# Patient Record
Sex: FEMALE | Race: BLACK OR AFRICAN AMERICAN | Employment: FULL TIME | URBAN - METROPOLITAN AREA
[De-identification: names, ages, dates, MRNs, and addresses within clinical notes are randomized per-mention and may not be internally consistent; named-entity substitution may affect disease eponyms.]

---

## 2017-01-06 ENCOUNTER — HOSPITAL ENCOUNTER (OUTPATIENT)
Dept: LAB | Age: 62
Discharge: HOME | End: 2017-01-06
Payer: COMMERCIAL

## 2017-01-06 ENCOUNTER — OFFICE VISIT (OUTPATIENT)
Dept: SURGERY | Age: 62
End: 2017-01-06

## 2017-01-06 VITALS
OXYGEN SATURATION: 99 % | WEIGHT: 178 LBS | SYSTOLIC BLOOD PRESSURE: 121 MMHG | TEMPERATURE: 97.8 F | HEART RATE: 69 BPM | DIASTOLIC BLOOD PRESSURE: 66 MMHG | HEIGHT: 61 IN | BODY MASS INDEX: 33.61 KG/M2 | RESPIRATION RATE: 14 BRPM

## 2017-01-06 DIAGNOSIS — E11.9 DIABETES MELLITUS TYPE 2, DIET-CONTROLLED (HCC): ICD-10-CM

## 2017-01-06 DIAGNOSIS — I10 ESSENTIAL HYPERTENSION: ICD-10-CM

## 2017-01-06 DIAGNOSIS — R09.89 BRUIT OF RIGHT CAROTID ARTERY: ICD-10-CM

## 2017-01-06 DIAGNOSIS — Z01.419 ENCOUNTER FOR GYNECOLOGICAL EXAMINATION WITHOUT ABNORMAL FINDING: Primary | ICD-10-CM

## 2017-01-06 DIAGNOSIS — Z98.891 HISTORY OF C-SECTION: ICD-10-CM

## 2017-01-06 DIAGNOSIS — N95.1 SYMPTOMATIC MENOPAUSAL OR FEMALE CLIMACTERIC STATES: ICD-10-CM

## 2017-01-06 PROCEDURE — 88142 CYTOPATH C/V THIN LAYER: CPT | Performed by: OBSTETRICS & GYNECOLOGY

## 2017-01-06 NOTE — PROGRESS NOTES
SUBJECTIVE: Jeanne Lam is a 63 y.o. female who presents with desire for annual well woman exam. No LMP recorded. Patient is postmenopausal.     History of endometriosis now in remission. History of gastric bypass surgery. History of intestinal adhesions and intestinal obstruction on more than one occasion. Menstrual period 2009 and also bled in mid-August as well. Pelvic ultrasound 2009 revealed uterus to be within normal limits of size, measuring 5.2 x 6.3 x 9.5 cm with multiple small fibroids, the largest measuring 2.5 x 3.2 x 3.0 cm and fundal. The endometrial stripe was 1.1 cm. The right ovary was not visualized. The left ovary measured 1.3 x 2.1 x 2.4 cm. No complaints today. Allergies   Allergen Reactions    Dilaudid [Hydromorphone] Nausea Only    Ultram [Tramadol] Nausea Only      Past Medical History   Diagnosis Date    Musculoskeletal disorder      Past Surgical History   Procedure Laterality Date    Hx  section      Hx tubal ligation  48    Hx cholecystectomy      Hx gastric bypass      Hx small bowel resection       OB History      Para Term  AB TAB SAB Ectopic Multiple Living    2 1   1             Family History   Problem Relation Age of Onset    Hypertension Mother     Diabetes Mother     Heart Disease Mother      CHF    Heart Disease Father      massive M. I.    Alcohol abuse Brother     Alcohol abuse Brother      Social History     Social History    Marital status: SINGLE     Spouse name: N/A    Number of children: N/A    Years of education: N/A     Occupational History    [employer]      Social History Main Topics    Smoking status: Never Smoker    Smokeless tobacco: Never Used    Alcohol use No    Drug use: Not on file    Sexual activity: No     Other Topics Concern    Not on file     Social History Narrative     Current Outpatient Prescriptions   Medication Sig Dispense Refill    glucose blood VI test strips (FREESTYLE INSULINX TEST STRIPS) strip Use to test blood sugar twice daily. 50 Strip 11    pravastatin (PRAVACHOL) 40 mg tablet Take 1 Tab by mouth nightly. 30 Tab 11    saxagliptin (ONGLYZA) 5 mg tab tablet TAKE 1 TABLET BY MOUTH EVERY DAY 30 Tab 5    cholecalciferol (VITAMIN D3) 5,000 unit capsule TAKE ONE CAPSULE BY MOUTH EVERY DAY  11    spironolactone-hydrochlorothiazide (ALDACTAZIDE) 25-25 mg per tablet TAKE 1 TABLET BY MOUTH ONCE A DAY 30 Tab 11    VITAMIN A PO Take  by mouth.  CYANOCOBALAMIN, VITAMIN B-12, (VITAMIN B-12 PO) Take  by mouth.  CALCIUM PO Take  by mouth.  oxyCODONE-acetaminophen (PERCOCET) 5-325 mg per tablet Take 1 Tab by mouth every eight (8) hours as needed for Pain. Max Daily Amount: 3 Tabs. 20 Tab 0       Review of Systems:   Constitutional: No weight change, chills or fever, anorexia, weakness or sleep disturbance . Cardiovascular: No chest pain, shortness of breath, or palpitations . Respiratory: No cough, shortness of breath, hemoptysis, or orthopnea . Neurologic: No syncope, headaches or seizures . Hematologic: No easy bruising or unusual bleeding . Psychiatric: No insomnia, confusion, depression, or anxiety . GI:No nausea and vomiting, diarrhea or constipation  . : See HPI . Musculoskeletal: No joint pain or muscle pain . Endocrine: No polydipsia, polyuria, cold intolerance, excessive fatigue, or sleep disturbance . Integumentary: No breast pain, lumps, nipple discharge, or axillary lumps .     Objective:     Visit Vitals    /66    Pulse 69    Temp 97.8 °F (36.6 °C) (Oral)    Resp 14    Ht 5' 1\" (1.549 m)    Wt 178 lb (80.7 kg)    SpO2 99%    BMI 33.63 kg/m2       General:  alert, cooperative, no distress, appears stated age   Skin:  no rash or abnormalities   Eyes: negative   Mouth: MMM no lesions   Lymph Nodes:  Cervical, supraclavicular, and axillary nodes normal.   Breast Exam: normal appearance, no masses or tenderness    Lungs:  clear to auscultation bilaterally   Heart:  regular rate and rhythm   Abdomen: soft, non-tender. Bowel sounds normal. No masses,  no organomegaly   Back:  Costovertebral angle tenderness absent   Genitourinary: Pelvic exam: VULVA: normal appearing vulva with no masses, tenderness or lesions, VAGINA: normal appearing vagina with normal color and discharge, no lesions, CERVIX: normal appearing cervix without discharge or lesions, UTERUS: uterus is normal size, shape, consistency and nontender, ADNEXA: normal adnexa in size, nontender and no masses. Examination restricted by obesity    Extremities:  extremities normal, atraumatic, no cyanosis or edema   Neurologic:  sensation grossly intact. Psychiatric:  non focal     ASSESSMENT:      ICD-10-CM ICD-9-CM    1. Encounter for gynecological examination without abnormal finding Z01.419 V72.31 PAP, LB, RFX HPV VXNYI([id number])   2. Symptomatic menopausal or female climacteric states N95.1 627.2    3. History of  Z98.891 V45.89    4. Diabetes mellitus type 2, diet-controlled (HCC) E11.9 250.00    5. Essential hypertension I10 401.9    6. Bruit of right carotid artery R09.89 785.9      Mammogram done already at [address] [place] [address]. Follow-up Disposition:  Return in about 1 year (around 2018), or if symptoms worsen or fail to improve.

## 2017-01-23 ENCOUNTER — OFFICE VISIT (OUTPATIENT)
Dept: INTERNAL MEDICINE CLINIC | Age: 62
End: 2017-01-23

## 2017-01-23 VITALS
TEMPERATURE: 98.7 F | SYSTOLIC BLOOD PRESSURE: 107 MMHG | DIASTOLIC BLOOD PRESSURE: 72 MMHG | HEART RATE: 91 BPM | HEIGHT: 61 IN | OXYGEN SATURATION: 96 % | WEIGHT: 172.7 LBS | BODY MASS INDEX: 32.61 KG/M2

## 2017-01-23 DIAGNOSIS — E11.9 CONTROLLED TYPE 2 DIABETES MELLITUS WITHOUT COMPLICATION, WITHOUT LONG-TERM CURRENT USE OF INSULIN (HCC): Primary | ICD-10-CM

## 2017-01-23 DIAGNOSIS — R09.89 BRUIT OF RIGHT CAROTID ARTERY: ICD-10-CM

## 2017-01-23 DIAGNOSIS — Z12.11 ENCOUNTER FOR SCREENING COLONOSCOPY: ICD-10-CM

## 2017-01-23 DIAGNOSIS — R63.4 WEIGHT LOSS: ICD-10-CM

## 2017-01-23 DIAGNOSIS — I10 ESSENTIAL HYPERTENSION: ICD-10-CM

## 2017-01-23 DIAGNOSIS — E78.5 DYSLIPIDEMIA: ICD-10-CM

## 2017-01-23 LAB — GLUCOSE POC: 194 MG/DL

## 2017-01-23 RX ORDER — GABAPENTIN 100 MG/1
100 CAPSULE ORAL 3 TIMES DAILY
COMMUNITY
End: 2018-02-09 | Stop reason: SDUPTHER

## 2017-01-23 NOTE — PROGRESS NOTES
Chief Complaint   Patient presents with    High Blood Sugar     patient states that blood sugar has been \"extremely high\"    Weight Loss     1. Have you been to the ER, urgent care clinic since your last visit? Hospitalized since your last visit? No    2. Have you seen or consulted any other health care providers outside of the [address] [place] [address] since your last visit? Include any pap smears or colon screening.  No

## 2017-01-23 NOTE — MR AVS SNAPSHOT
Visit Information Date & Time Provider Department Dept. Phone Encounter #  
 1/23/2017 10:45 AM Gold Aguilar 80 [facility] [id number] Follow-up Instructions Return in about 4 weeks (around 2/20/2017). Your Appointments 2/17/2017  9:00 AM  
Any with Nikky Peters MD  
[address] [place] [address] and Primary Care [facility]) Appt Note: 6mo f/u  
 [address] 1 [facility] [place]  
  
   
 [address] [id number] Upcoming Health Maintenance Date Due Pneumococcal 19-64 Medium Risk (1 of 1 - PPSV23) 3/11/1976 DTaP/Tdap/Td series (1 - Tdap) 3/11/1978 FOBT Q 1 YEAR AGE 50-75 4/30/2016 INFLUENZA AGE 9 TO ADULT 8/1/2016 MICROALBUMIN Q1 9/17/2016 FOOT EXAM Q1 3/7/2017 EYE EXAM RETINAL OR DILATED Q1 3/24/2017 HEMOGLOBIN A1C Q6M 5/10/2017 LIPID PANEL Q1 10/14/2017 BREAST CANCER SCRN MAMMOGRAM 10/19/2018 PAP AKA CERVICAL CYTOLOGY 1/6/2020 Allergies as of 1/23/2017  Review Complete On: 1/23/2017 By: Mandi Barragan Severity Noted Reaction Type Reactions Dilaudid [Hydromorphone]  10/02/2013    Nausea Only Ultram [Tramadol]  10/02/2013    Nausea Only Current Immunizations  Never Reviewed No immunizations on file. Not reviewed this visit You Were Diagnosed With   
  
 Codes Comments Controlled type 2 diabetes mellitus without complication, without long-term current use of insulin ([facility]    -  Primary ICD-10-CM: E11.9 ICD-9-CM: 250.00 Essential hypertension     ICD-10-CM: I10 
ICD-9-CM: 401.9 Dyslipidemia     ICD-10-CM: E78.5 ICD-9-CM: 272.4 Bruit of right carotid artery     ICD-10-CM: R09.89 ICD-9-CM: 475. 9 Weight loss     ICD-10-CM: R63.4 ICD-9-CM: 783.21 Vitals BP Pulse Temp Height(growth percentile) Weight(growth percentile) SpO2 107/72 (BP 1 Location: Right arm, BP Patient Position: Sitting) 91 98.7 °F (37.1 °C) (Oral) 5' 1\" (1.549 m) 172 lb 11.2 oz (78.3 kg) 96% BMI OB Status Smoking Status 32.63 kg/m2 Postmenopausal Never Smoker BMI and BSA Data Body Mass Index Body Surface Area  
 32.63 kg/m 2 1.84 m 2 Preferred Pharmacy Pharmacy Name Phone [id number] [address] [facility] [phone] Your Updated Medication List  
  
   
This list is accurate as of: 1/23/17 12:39 PM.  Always use your most recent med list.  
  
  
  
  
 CALCIUM PO Take  by mouth. cholecalciferol 5,000 unit capsule Commonly known as:  VITAMIN D3  
TAKE ONE CAPSULE BY MOUTH EVERY DAY  
  
 gabapentin 100 mg capsule Commonly known as:  NEURONTIN Take 100 mg by mouth three (3) times daily. glucose blood VI test strips strip Commonly known as:  FREESTYLE INSULINX TEST STRIPS Use to test blood sugar twice daily. pravastatin 40 mg tablet Commonly known as:  PRAVACHOL Take 1 Tab by mouth nightly. sAXagliptin 5 mg Tab tablet Commonly known as:  ONGLYZA TAKE 1 TABLET BY MOUTH EVERY DAY  
  
 spironolactone-hydrochlorothiazide 25-25 mg per tablet Commonly known as:  ALDACTAZIDE TAKE 1 TABLET BY MOUTH ONCE A DAY  
  
 VITAMIN A PO Take  by mouth. VITAMIN B-12 PO Take  by mouth. We Performed the Following AMB POC GLUCOSE BLOOD, BY GLUCOSE MONITORING DEVICE [82546 CPT(R)] CBC WITH AUTOMATED DIFF [68978 CPT(R)] HEMOGLOBIN A1C WITH EAG [49510 CPT(R)] METABOLIC PANEL, COMPREHENSIVE [29772 CPT(R)] Follow-up Instructions Return in about 4 weeks (around 2/20/2017). Introducing [facility] & [facility]! Dear Cornell Zamora: Thank you for requesting a [employer] account. Our records indicate that you already have an active [employer] account. You can access your account anytime at [email or web address] Did you know that you can access your hospital and ER discharge instructions at any time in [employer]? You can also review all of your test results from your hospital stay or ER visit. Additional Information If you have questions, please visit the Frequently Asked Questions section of the [employer] website at [email or web address] Remember, [employer] is NOT to be used for urgent needs. For medical emergencies, dial 911. Now available from your iPhone and Android! Please provide this summary of care documentation to your next provider. Your primary care clinician is listed as Petra De La Rosa. If you have any questions after today's visit, please call [phone].

## 2017-01-23 NOTE — PROGRESS NOTES
[address] [place] [address] and Primary Care  [place] [address]  [address]  [address] [address]  Phone:  [phone]  Fax: [phone]       Chief Complaint   Patient presents with    High Blood Sugar     patient states that blood sugar has been \"extremely high\"    Weight Loss   . SUBJECTIVE:    Po Mcdonough is a 63 y.o. female comes in with a history of hypertension, dyslipidemia, and chronic abdominal pain. She is concerned now about her diabetes. Her last hemoglobin A1C was 6.2 as has been the average one over the last two to three years. She states within the last several months her blood sugars have increased such that average fasting blood sugars are in the 150-160 range and on rare occasions when she checks a random sugar it was 200. She denies any polyuria, polydipsia. There has been no meaningful weight gain since I last saw her. She is contemplating having the mesh removed which she was told is the primary culprit leading to her chronic abdominal pain. Finally, additionally she is back to work part time. Current Outpatient Prescriptions   Medication Sig Dispense Refill    gabapentin (NEURONTIN) 100 mg capsule Take 100 mg by mouth three (3) times daily.  glucose blood VI test strips (FREESTYLE INSULINX TEST STRIPS) strip Use to test blood sugar twice daily. 50 Strip 11    pravastatin (PRAVACHOL) 40 mg tablet Take 1 Tab by mouth nightly. 30 Tab 11    saxagliptin (ONGLYZA) 5 mg tab tablet TAKE 1 TABLET BY MOUTH EVERY DAY 30 Tab 5    cholecalciferol (VITAMIN D3) 5,000 unit capsule TAKE ONE CAPSULE BY MOUTH EVERY DAY  11    spironolactone-hydrochlorothiazide (ALDACTAZIDE) 25-25 mg per tablet TAKE 1 TABLET BY MOUTH ONCE A DAY 30 Tab 11    VITAMIN A PO Take  by mouth.  CYANOCOBALAMIN, VITAMIN B-12, (VITAMIN B-12 PO) Take  by mouth.  CALCIUM PO Take  by mouth.        Past Medical History   Diagnosis Date    Musculoskeletal disorder      Past Surgical History Procedure Laterality Date    Hx  section      Hx tubal ligation      Hx cholecystectomy      Hx gastric bypass      Hx small bowel resection       Allergies   Allergen Reactions    Dilaudid [Hydromorphone] Nausea Only    Ultram [Tramadol] Nausea Only         REVIEW OF SYSTEMS:  General: negative for - chills or fever  ENT: negative for - headaches, nasal congestion or tinnitus  Respiratory: negative for - cough, hemoptysis, shortness of breath or wheezing  Cardiovascular : negative for - chest pain, edema, palpitations or shortness of breath  Gastrointestinal: negative for - abdominal pain, blood in stools, heartburn or nausea/vomiting  Genito-Urinary: no dysuria, trouble voiding, or hematuria  Musculoskeletal: negative for - gait disturbance, joint pain, joint stiffness or joint swelling  Neurological: no TIA or stroke symptoms  Hematologic: no bruises, no bleeding, no swollen glands  Integument: no lumps, mole changes, nail changes or rash  Endocrine: no malaise/lethargy or unexpected weight changes      Social History     Social History    Marital status: SINGLE     Spouse name: N/A    Number of children: N/A    Years of education: N/A     Occupational History    [employer]      Social History Main Topics    Smoking status: Never Smoker    Smokeless tobacco: Never Used    Alcohol use No    Drug use: No    Sexual activity: No     Other Topics Concern    None     Social History Narrative     Family History   Problem Relation Age of Onset    Hypertension Mother     Diabetes Mother     Heart Disease Mother      CHF    Heart Disease Father      massive M. IJaneal Burows Alcohol abuse Brother     Alcohol abuse Brother        OBJECTIVE:    Visit Vitals    /72 (BP 1 Location: Right arm, BP Patient Position: Sitting)    Pulse 91    Temp 98.7 °F (37.1 °C) (Oral)    Ht 5' 1\" (1.549 m)    Wt 172 lb 11.2 oz (78.3 kg)    SpO2 96%    BMI 32.63 kg/m2     CONSTITUTIONAL: well , well nourished, appears age appropriate  EYES: perrla, eom intact  ENMT:moist mucous membranes, pharynx clear  NECK: supple. Thyroid normal  RESPIRATORY: Chest: clear to ascultation and percussion   CARDIOVASCULAR: Heart: regular rate and rhythm  GASTROINTESTINAL: Abdomen: soft, bowel sounds active, mild tenderness to palpation left lower quadrant  HEMATOLOGIC: no pathological lymph nodes palpated  MUSCULOSKELETAL: Extremities: no edema, pulse 1+   INTEGUMENT: No unusual rashes or suspicious skin lesions noted. Nails appear normal.  NEUROLOGIC: non-focal exam   MENTAL STATUS: alert and oriented, appropriate affect      ASSESSMENT:  1. Controlled type 2 diabetes mellitus without complication, without long-term current use of insulin ([address]    2. Essential hypertension    3. Dyslipidemia    4. Bruit of right carotid artery    5. Weight loss    6. Encounter for screening colonoscopy        PLAN:    1. I will await the results of her hemoglobin A1C. Her blood sugar today is 194. Her last hemoglobin A1C's suggest this should not necessarily be the case in addition to the fact that over the last three months she has lost almost 10 pounds. 2. She wishes to have a colonoscopy. 3. Blood pressure today is excellent. No adjustments are made. 4. She will continue her statin as prescribed in view of her increased cardiovascular risk and the fact that she has subclinical disease. 5. She will follow-up with her general surgeon regarding her chronic abdominal pain. .  Orders Placed This Encounter    HEMOGLOBIN A1C WITH EAG    METABOLIC PANEL, COMPREHENSIVE    CBC WITH AUTOMATED DIFF    REFERRAL TO GASTROENTEROLOGY    AMB POC GLUCOSE BLOOD, BY GLUCOSE MONITORING DEVICE    gabapentin (NEURONTIN) 100 mg capsule         Follow-up Disposition:  Return in about 4 weeks (around 2/20/2017).       Paul Zamorano MD

## 2017-01-24 LAB
ALBUMIN SERPL-MCNC: 3.2 G/DL (ref 3.5–5.5)
ALBUMIN/GLOB SERPL: 0.8 {RATIO} (ref 1.1–2.5)
ALP SERPL-CCNC: 64 IU/L (ref 39–117)
ALT SERPL-CCNC: 12 IU/L (ref 0–32)
AST SERPL-CCNC: 10 IU/L (ref 0–40)
BASOPHILS # BLD AUTO: 0 X10E3/UL (ref 0–0.2)
BASOPHILS NFR BLD AUTO: 0 %
BILIRUB SERPL-MCNC: 0.4 MG/DL (ref 0–1.2)
BUN SERPL-MCNC: 8 MG/DL (ref 6–24)
BUN/CREAT SERPL: 14 (ref 9–23)
CALCIUM SERPL-MCNC: 8.9 MG/DL (ref 8.7–10.2)
CHLORIDE SERPL-SCNC: 96 MMOL/L (ref 96–106)
CO2 SERPL-SCNC: 25 MMOL/L (ref 18–29)
CREAT SERPL-MCNC: 0.59 MG/DL (ref 0.57–1)
EOSINOPHIL # BLD AUTO: 0 X10E3/UL (ref 0–0.4)
EOSINOPHIL NFR BLD AUTO: 1 %
ERYTHROCYTE [DISTWIDTH] IN BLOOD BY AUTOMATED COUNT: 19.1 % (ref 12.3–15.4)
EST. AVERAGE GLUCOSE BLD GHB EST-MCNC: 131 MG/DL
GLOBULIN SER CALC-MCNC: 4 G/DL (ref 1.5–4.5)
GLUCOSE SERPL-MCNC: 139 MG/DL (ref 65–99)
HBA1C MFR BLD: 6.2 % (ref 4.8–5.6)
HCT VFR BLD AUTO: 27.7 % (ref 34–46.6)
HGB BLD-MCNC: 8.2 G/DL (ref 11.1–15.9)
IMM GRANULOCYTES # BLD: 0 X10E3/UL (ref 0–0.1)
IMM GRANULOCYTES NFR BLD: 0 %
LYMPHOCYTES # BLD AUTO: 2.3 X10E3/UL (ref 0.7–3.1)
LYMPHOCYTES NFR BLD AUTO: 43 %
MCH RBC QN AUTO: 23.4 PG (ref 26.6–33)
MCHC RBC AUTO-ENTMCNC: 29.6 G/DL (ref 31.5–35.7)
MCV RBC AUTO: 79 FL (ref 79–97)
MONOCYTES # BLD AUTO: 1 X10E3/UL (ref 0.1–0.9)
MONOCYTES NFR BLD AUTO: 18 %
NEUTROPHILS # BLD AUTO: 2 X10E3/UL (ref 1.4–7)
NEUTROPHILS NFR BLD AUTO: 38 %
PLATELET # BLD AUTO: 520 X10E3/UL (ref 150–379)
POTASSIUM SERPL-SCNC: 4.4 MMOL/L (ref 3.5–5.2)
PROT SERPL-MCNC: 7.2 G/DL (ref 6–8.5)
RBC # BLD AUTO: 3.51 X10E6/UL (ref 3.77–5.28)
SODIUM SERPL-SCNC: 137 MMOL/L (ref 134–144)
WBC # BLD AUTO: 5.3 X10E3/UL (ref 3.4–10.8)

## 2017-02-02 ENCOUNTER — TELEPHONE (OUTPATIENT)
Dept: INTERNAL MEDICINE CLINIC | Age: 62
End: 2017-02-02

## 2017-02-03 ENCOUNTER — LAB ONLY (OUTPATIENT)
Dept: INTERNAL MEDICINE CLINIC | Age: 62
End: 2017-02-03

## 2017-02-03 ENCOUNTER — OFFICE VISIT (OUTPATIENT)
Dept: INTERNAL MEDICINE CLINIC | Age: 62
End: 2017-02-03

## 2017-02-03 VITALS
HEIGHT: 61 IN | BODY MASS INDEX: 32.59 KG/M2 | OXYGEN SATURATION: 97 % | WEIGHT: 172.6 LBS | DIASTOLIC BLOOD PRESSURE: 81 MMHG | TEMPERATURE: 99.7 F | SYSTOLIC BLOOD PRESSURE: 129 MMHG | HEART RATE: 117 BPM

## 2017-02-03 DIAGNOSIS — R10.10 PAIN OF UPPER ABDOMEN: ICD-10-CM

## 2017-02-03 DIAGNOSIS — D64.9 ANEMIA, UNSPECIFIED TYPE: Primary | ICD-10-CM

## 2017-02-03 DIAGNOSIS — D64.9 ANEMIA, UNSPECIFIED TYPE: ICD-10-CM

## 2017-02-03 RX ORDER — HYDROCODONE BITARTRATE AND ACETAMINOPHEN 5; 325 MG/1; MG/1
1 TABLET ORAL
Qty: 40 TAB | Refills: 0 | Status: SHIPPED | OUTPATIENT
Start: 2017-02-03

## 2017-02-03 RX ORDER — METFORMIN HYDROCHLORIDE 500 MG/1
500 TABLET, EXTENDED RELEASE ORAL
Qty: 90 TAB | Refills: 11 | Status: SHIPPED | OUTPATIENT
Start: 2017-02-03 | End: 2018-02-28

## 2017-02-03 RX ORDER — METFORMIN HYDROCHLORIDE 500 MG/1
500 TABLET, EXTENDED RELEASE ORAL 2 TIMES DAILY WITH MEALS
Qty: 60 TAB | Refills: 11 | Status: SHIPPED | OUTPATIENT
Start: 2017-02-03 | End: 2017-02-03 | Stop reason: CLARIF

## 2017-02-03 NOTE — PROGRESS NOTES
Chief Complaint   Patient presents with    Chest Pain     patient states that she is having pressure in her chest.    Other     patient states that since she was here in Jan. another \"knot\" has appeared on her stomach.  Abdominal Pain     1. Have you been to the ER, urgent care clinic since your last visit? Hospitalized since your last visit? No    2. Have you seen or consulted any other health care providers outside of the [address] [place] [address] since your last visit? Include any pap smears or colon screening.  No

## 2017-02-03 NOTE — MR AVS SNAPSHOT
Visit Information Date & Time Provider Department Dept. Phone Encounter #  
 2/3/2017 12:30 PM Crow Dominguezdeandramartha Funez 80 Sports Medicine and Primary Care [phone] [id number] Your Appointments 2/17/2017  9:00 AM  
Any with Essie Whitehead MD  
[address] [place] [address] and Primary Care [facility]) Appt Note: 6mo f/u  
 [address] 1 [facility]  
  
   
 [address] [address] Upcoming Health Maintenance Date Due Pneumococcal 19-64 Medium Risk (1 of 1 - PPSV23) 3/11/1976 DTaP/Tdap/Td series (1 - Tdap) 3/11/1978 FOBT Q 1 YEAR AGE 50-75 4/30/2016 INFLUENZA AGE 9 TO ADULT 8/1/2016 MICROALBUMIN Q1 9/17/2016 FOOT EXAM Q1 3/7/2017 EYE EXAM RETINAL OR DILATED Q1 3/24/2017 HEMOGLOBIN A1C Q6M 7/23/2017 LIPID PANEL Q1 10/14/2017 BREAST CANCER SCRN MAMMOGRAM 10/19/2018 PAP AKA CERVICAL CYTOLOGY 1/6/2020 Allergies as of 2/3/2017  Review Complete On: 1/29/2017 By: Essie Whitehead MD  
  
 Severity Noted Reaction Type Reactions Dilaudid [Hydromorphone]  10/02/2013    Nausea Only Ultram [Tramadol]  10/02/2013    Nausea Only Current Immunizations  Never Reviewed No immunizations on file. Not reviewed this visit You Were Diagnosed With   
  
 Codes Comments Controlled type 2 diabetes mellitus without complication, without long-term current use of insulin ([facility]    -  Primary ICD-10-CM: E11.9 ICD-9-CM: 250.00 Pain of upper abdomen     ICD-10-CM: R10.10 ICD-9-CM: 789.09 Vitals BP Pulse Temp Height(growth percentile) Weight(growth percentile) SpO2  
 129/81 (BP 1 Location: Right arm, BP Patient Position: Sitting) (!) 117 99.7 °F (37.6 °C) (Oral) 5' 1\" (1.549 m) 172 lb 9.6 oz (78.3 kg) 97% BMI OB Status Smoking Status 32.61 kg/m2 Postmenopausal Never Smoker BMI and BSA Data  Body Mass Index Body Surface Area  
 32.61 kg/m 2 1.84 m 2  
  
  
 Preferred Pharmacy Pharmacy Name Phone [id number] Al, [address] [place] [phone] Your Updated Medication List  
  
   
This list is accurate as of: 2/3/17  2:50 PM.  Always use your most recent med list.  
  
  
  
  
 CALCIUM PO Take  by mouth. cholecalciferol 5,000 unit capsule Commonly known as:  VITAMIN D3  
TAKE ONE CAPSULE BY MOUTH EVERY DAY  
  
 gabapentin 100 mg capsule Commonly known as:  NEURONTIN Take 100 mg by mouth three (3) times daily. glucose blood VI test strips strip Commonly known as:  FREESTYLE INSULINX TEST STRIPS Use to test blood sugar twice daily. HYDROcodone-acetaminophen 5-325 mg per tablet Commonly known as:  Nora Saúl Take 1 Tab by mouth every eight (8) hours as needed for Pain. Max Daily Amount: 3 Tabs. metFORMIN  mg tablet Commonly known as:  GLUCOPHAGE XR Take 1 Tab by mouth two (2) times daily (with meals). pravastatin 40 mg tablet Commonly known as:  PRAVACHOL Take 1 Tab by mouth nightly. sAXagliptin 5 mg Tab tablet Commonly known as:  ONGLYZA TAKE 1 TABLET BY MOUTH EVERY DAY  
  
 spironolactone-hydrochlorothiazide 25-25 mg per tablet Commonly known as:  ALDACTAZIDE TAKE 1 TABLET BY MOUTH ONCE A DAY  
  
 VITAMIN A PO Take  by mouth. VITAMIN B-12 PO Take  by mouth. Prescriptions Printed Refills HYDROcodone-acetaminophen (NORCO) 5-325 mg per tablet 0 Sig: Take 1 Tab by mouth every eight (8) hours as needed for Pain. Max Daily Amount: 3 Tabs. Class: Print Route: Oral  
  
Prescriptions Sent to Pharmacy Refills  
 metFORMIN ER (GLUCOPHAGE XR) 500 mg tablet 11 Sig: Take 1 Tab by mouth two (2) times daily (with meals). Class: Normal  
 Pharmacy: [address] Ph #: [phone] Route: Oral  
  
Introducing [facility] & HEALTH SERVICES! Dear Luciana Dasilva: Thank you for requesting a [employer] account. Our records indicate that you already have an active [employer] account. You can access your account anytime at [email or web address] Did you know that you can access your hospital and ER discharge instructions at any time in [employer]? You can also review all of your test results from your hospital stay or ER visit. Additional Information If you have questions, please visit the Frequently Asked Questions section of the [employer] website at [email or web address] Remember, [employer] is NOT to be used for urgent needs. For medical emergencies, dial 911. Now available from your iPhone and Android! Please provide this summary of care documentation to your next provider. Your primary care clinician is listed as Petra De La Rosa. If you have any questions after today's visit, please call [phone].

## 2017-02-04 NOTE — PROGRESS NOTES
[facility]  [place] [address]  [address]  [address] [place] [address]  Phone:  [phone]  Fax: [phone]       Chief Complaint   Patient presents with    Chest Pain     patient states that she is having pressure in her chest.    Other     patient states that since she was here in Jan. another \"knot\" has appeared on her stomach.  Abdominal Pain   . SUBJECTIVE:    Nikia Mcmanus is a 63 y.o. female With a past history of chronic abdominal pain, primary hypertension, dyslipidemia, and carotid bruits, and comes in for follow-up. She is concerned because her blood sugars have progressively increased which is fascinating. Her last hemoglobin A1C done two weeks ago was perfectly normal but this change in her glucose status occurred within the last several weeks. She is not getting any new medication. She is currently taking Onglyza but obviously needs something more. She continues to complain of abdominal pain and would like something for pain. Allegedly she plans surgery in the near future. Current Outpatient Prescriptions   Medication Sig Dispense Refill    HYDROcodone-acetaminophen (NORCO) 5-325 mg per tablet Take 1 Tab by mouth every eight (8) hours as needed for Pain. Max Daily Amount: 3 Tabs. 40 Tab 0    metFORMIN ER (GLUCOPHAGE XR) 500 mg tablet Take 1 Tab by mouth three (3) times daily (with meals). 90 Tab 11    gabapentin (NEURONTIN) 100 mg capsule Take 100 mg by mouth three (3) times daily.  glucose blood VI test strips (FREESTYLE INSULINX TEST STRIPS) strip Use to test blood sugar twice daily. 50 Strip 11    pravastatin (PRAVACHOL) 40 mg tablet Take 1 Tab by mouth nightly.  30 Tab 11    saxagliptin (ONGLYZA) 5 mg tab tablet TAKE 1 TABLET BY MOUTH EVERY DAY 30 Tab 5    cholecalciferol (VITAMIN D3) 5,000 unit capsule TAKE ONE CAPSULE BY MOUTH EVERY DAY  11    spironolactone-hydrochlorothiazide (ALDACTAZIDE) 25-25 mg per tablet TAKE 1 TABLET BY MOUTH ONCE A DAY 30 Tab 11    VITAMIN A PO Take  by mouth.  CYANOCOBALAMIN, VITAMIN B-12, (VITAMIN B-12 PO) Take  by mouth.  CALCIUM PO Take  by mouth. Past Medical History   Diagnosis Date    Musculoskeletal disorder      Past Surgical History   Procedure Laterality Date    Hx  section      Hx tubal ligation      Hx cholecystectomy      Hx gastric bypass      Hx small bowel resection       Allergies   Allergen Reactions    Dilaudid [Hydromorphone] Nausea Only    Ultram [Tramadol] Nausea Only         REVIEW OF SYSTEMS:  General: negative for - chills or fever  ENT: negative for - headaches, nasal congestion or tinnitus  Respiratory: negative for - cough, hemoptysis, shortness of breath or wheezing  Cardiovascular : negative for - chest pain, edema, palpitations or shortness of breath  Gastrointestinal: negative for - abdominal pain, blood in stools, heartburn or nausea/vomiting  Genito-Urinary: no dysuria, trouble voiding, or hematuria  Musculoskeletal: negative for - gait disturbance, joint pain, joint stiffness or joint swelling  Neurological: no TIA or stroke symptoms  Hematologic: no bruises, no bleeding, no swollen glands  Integument: no lumps, mole changes, nail changes or rash  Endocrine: no malaise/lethargy or unexpected weight changes      Social History     Social History    Marital status: SINGLE     Spouse name: N/A    Number of children: N/A    Years of education: N/A     Occupational History    [employer]      Social History Main Topics    Smoking status: Never Smoker    Smokeless tobacco: Never Used    Alcohol use No    Drug use: No    Sexual activity: No     Other Topics Concern    None     Social History Narrative     Family History   Problem Relation Age of Onset    Hypertension Mother     Diabetes Mother     Heart Disease Mother      CHF    Heart Disease Father      massive M. I.    Alcohol abuse Brother     Alcohol abuse Brother OBJECTIVE:    Visit Vitals    /81 (BP 1 Location: Right arm, BP Patient Position: Sitting)    Pulse (!) 117    Temp 99.7 °F (37.6 °C) (Oral)    Ht 5' 1\" (1.549 m)    Wt 172 lb 9.6 oz (78.3 kg)    SpO2 97%    BMI 32.61 kg/m2     CONSTITUTIONAL: well , well nourished, appears age appropriate  EYES: perrla, eom intact  ENMT:moist mucous membranes, pharynx clear  NECK: supple. Thyroid normal  RESPIRATORY: Chest: clear to ascultation and percussion   CARDIOVASCULAR: Heart: regular rate and rhythm  GASTROINTESTINAL: Abdomen: soft, bowel sounds active, mild generalized tenderness, no organomegaly or masses, ventral hernia  HEMATOLOGIC: no pathological lymph nodes palpated  MUSCULOSKELETAL: Extremities: no edema, pulse 1+   INTEGUMENT: No unusual rashes or suspicious skin lesions noted. Nails appear normal.  NEUROLOGIC: non-focal exam   MENTAL STATUS: alert and oriented, appropriate affect      ASSESSMENT:  1. Uncontrolled type 2 diabetes mellitus without complication, without long-term current use of insulin (HCC)    2. Pain of upper abdomen    3. Anemia, unspecified type        PLAN:    1. Her diabetes is poorly controlled. Part of this is dietary but obviously she has progressed to the point where she needs additional medications. I will therefore place her on Metformin extended release 500 mg with breakfast, lunch and dinner. She will continue the Onglyza 5 mg every day. I will see how she fairs with this. She will return to the office in the next one to two weeks. If her blood sugars remain elevated, specifically over 140 she will give us a call. 2. As far as her blood pressure pain is concerned, she will follow-up with the general surgeon. I will give her Hydrocodone but I again explained to her that this will only be a limited fashion. Ideally pain control needs to be in the hands of the surgeon. 3. She is also mildly anemia and I will repeat the CBC today.    4. Her blood pressure is excellent. No adjustments are made. 5. She will continue her statin as prescribed in view of her secondary cardiovascular risk. .  Orders Placed This Encounter    CBC WITH AUTOMATED DIFF    METABOLIC PANEL, BASIC    FRUCTOSAMINE    HYDROcodone-acetaminophen (NORCO) 5-325 mg per tablet    DISCONTD: metFORMIN ER (GLUCOPHAGE XR) 500 mg tablet    metFORMIN ER (GLUCOPHAGE XR) 500 mg tablet         Follow-up Disposition:  Return in about 2 weeks (around 2/17/2017).       Shira Hutchison MD

## 2017-02-06 ENCOUNTER — TELEPHONE (OUTPATIENT)
Dept: INTERNAL MEDICINE CLINIC | Age: 62
End: 2017-02-06

## 2017-02-06 LAB
BASOPHILS # BLD AUTO: NORMAL 10*3/UL
BUN SERPL-MCNC: 9 MG/DL (ref 6–24)
BUN/CREAT SERPL: 18 (ref 9–23)
CALCIUM SERPL-MCNC: 8.9 MG/DL (ref 8.7–10.2)
CHLORIDE SERPL-SCNC: 90 MMOL/L (ref 96–106)
CO2 SERPL-SCNC: 24 MMOL/L (ref 18–29)
CREAT SERPL-MCNC: 0.49 MG/DL (ref 0.57–1)
EOSINOPHIL # BLD AUTO: NORMAL 10*3/UL
EOSINOPHIL NFR BLD AUTO: NORMAL %
FRUCTOSAMINE SERPL-SCNC: 227 UMOL/L (ref 0–285)
GLUCOSE SERPL-MCNC: 319 MG/DL (ref 65–99)
HCT VFR BLD AUTO: NORMAL %
HGB BLD-MCNC: NORMAL G/DL
LYMPHOCYTES # BLD AUTO: NORMAL 10*3/UL
LYMPHOCYTES NFR BLD AUTO: NORMAL %
MONOCYTES NFR BLD AUTO: NORMAL %
NEUTROPHILS NFR BLD AUTO: NORMAL %
PLATELET # BLD AUTO: NORMAL 10*3/UL
POTASSIUM SERPL-SCNC: 4.3 MMOL/L (ref 3.5–5.2)
RBC # BLD AUTO: NORMAL 10*6/UL
SODIUM SERPL-SCNC: 132 MMOL/L (ref 134–144)
WBC # BLD AUTO: NORMAL X10E3/UL

## 2017-02-13 ENCOUNTER — PATIENT OUTREACH (OUTPATIENT)
Dept: INTERNAL MEDICINE CLINIC | Age: 62
End: 2017-02-13

## 2017-02-13 NOTE — PROGRESS NOTES
Outreach attempted patient Discharged from [facility] on 2/11/17 scheduled follow up  with Dr Manisha Ruiz 2/17/17.

## 2017-06-22 ENCOUNTER — OFFICE VISIT (OUTPATIENT)
Dept: INTERNAL MEDICINE CLINIC | Age: 62
End: 2017-06-22

## 2017-06-22 VITALS
OXYGEN SATURATION: 96 % | WEIGHT: 165.8 LBS | TEMPERATURE: 98.1 F | RESPIRATION RATE: 18 BRPM | DIASTOLIC BLOOD PRESSURE: 79 MMHG | BODY MASS INDEX: 31.3 KG/M2 | HEART RATE: 79 BPM | SYSTOLIC BLOOD PRESSURE: 115 MMHG | HEIGHT: 61 IN

## 2017-06-22 DIAGNOSIS — T85.79XD INFECTED PROSTHETIC MESH OF ABDOMINAL WALL, SUBSEQUENT ENCOUNTER: Primary | ICD-10-CM

## 2017-06-22 DIAGNOSIS — E78.5 DYSLIPIDEMIA: ICD-10-CM

## 2017-06-22 DIAGNOSIS — E11.9 CONTROLLED TYPE 2 DIABETES MELLITUS WITHOUT COMPLICATION, WITHOUT LONG-TERM CURRENT USE OF INSULIN (HCC): ICD-10-CM

## 2017-06-22 DIAGNOSIS — E66.09 NON MORBID OBESITY DUE TO EXCESS CALORIES: ICD-10-CM

## 2017-06-22 DIAGNOSIS — I10 ESSENTIAL HYPERTENSION: ICD-10-CM

## 2017-06-22 PROBLEM — T85.79XA INFECTED PROSTHETIC MESH OF ABDOMINAL WALL (HCC): Status: ACTIVE | Noted: 2017-06-22

## 2017-06-22 RX ORDER — OXYCODONE HYDROCHLORIDE 5 MG/1
5 TABLET ORAL
Qty: 10 TAB | Refills: 0 | Status: SHIPPED | OUTPATIENT
Start: 2017-06-22 | End: 2018-02-28

## 2017-06-22 RX ORDER — SIMVASTATIN 20 MG/1
TABLET, FILM COATED ORAL
COMMUNITY
End: 2018-10-02 | Stop reason: CLARIF

## 2017-06-22 NOTE — PROGRESS NOTES
[address] [place] [address] and Primary Care  [place] [address]  [address] [place] [address]  Phone:  [phone]  Fax: [phone]       Chief Complaint   Patient presents with    Follow-up   . SUBJECTIVE:    Anna Tilley is a 63 y.o. female comes in for return visit having had a rather prolonged, stormy hospital course. She had an infected mesh which required four hours of surgery and formation of an ostomy. She went to [facility] because she was getting fed parenterally for an extended period of time. Finally she is back home and apparently is doing well, eating regular food. They plan to take the ostomy down in the next several months. During her hospital stay she was placed on insulin which is not surprising, Lantus and Humalog although she was not really given the appropriate directions. She is additionally taking her statin. She resumed her antihypertensive medication which is Aldactazide 25/25 one q.a.m. Since I last saw her she has lost a considerable amount of weight but this is now stable. Current Outpatient Prescriptions   Medication Sig Dispense Refill    simvastatin (ZOCOR) 20 mg tablet Take  by mouth nightly.  oxyCODONE IR (ROXICODONE) 5 mg immediate release tablet Take 1 Tab by mouth two (2) times daily as needed for Pain. Max Daily Amount: 10 mg. 10 Tab 0    HYDROcodone-acetaminophen (NORCO) 5-325 mg per tablet Take 1 Tab by mouth every eight (8) hours as needed for Pain. Max Daily Amount: 3 Tabs. 40 Tab 0    metFORMIN ER (GLUCOPHAGE XR) 500 mg tablet Take 1 Tab by mouth three (3) times daily (with meals). 90 Tab 11    gabapentin (NEURONTIN) 100 mg capsule Take 100 mg by mouth three (3) times daily.  glucose blood VI test strips (FREESTYLE INSULINX TEST STRIPS) strip Use to test blood sugar twice daily. 50 Strip 11    pravastatin (PRAVACHOL) 40 mg tablet Take 1 Tab by mouth nightly.  30 Tab 11    saxagliptin (ONGLYZA) 5 mg tab tablet TAKE 1 TABLET BY MOUTH EVERY DAY 30 Tab 5    cholecalciferol (VITAMIN D3) 5,000 unit capsule TAKE ONE CAPSULE BY MOUTH EVERY DAY  11    spironolactone-hydrochlorothiazide (ALDACTAZIDE) 25-25 mg per tablet TAKE 1 TABLET BY MOUTH ONCE A DAY 30 Tab 11    VITAMIN A PO Take  by mouth.  CYANOCOBALAMIN, VITAMIN B-12, (VITAMIN B-12 PO) Take  by mouth.  CALCIUM PO Take  by mouth.       Insulin Needles, Disposable, (KEYANA PEN NEEDLE) 32 gauge x \" ndle Use to inject insulin once daily 100 Pen Needle 11     Past Medical History:   Diagnosis Date    Musculoskeletal disorder      Past Surgical History:   Procedure Laterality Date    HX  SECTION      HX CHOLECYSTECTOMY      HX GASTRIC BYPASS      HX HERNIA REPAIR Bilateral     HX SMALL BOWEL RESECTION      HX TUBAL LIGATION       Allergies   Allergen Reactions    Dilaudid [Hydromorphone] Nausea Only    Ultram [Tramadol] Nausea Only         REVIEW OF SYSTEMS:  General: negative for - chills or fever  ENT: negative for - headaches, nasal congestion or tinnitus  Respiratory: negative for - cough, hemoptysis, shortness of breath or wheezing  Cardiovascular : negative for - chest pain, edema, palpitations or shortness of breath  Gastrointestinal: negative for - abdominal pain, blood in stools, heartburn or nausea/vomiting  Genito-Urinary: no dysuria, trouble voiding, or hematuria  Musculoskeletal: negative for - gait disturbance, joint pain, joint stiffness or joint swelling  Neurological: no TIA or stroke symptoms  Hematologic: no bruises, no bleeding, no swollen glands  Integument: no lumps, mole changes, nail changes or rash  Endocrine: no malaise/lethargy or unexpected weight changes      Social History     Social History    Marital status: SINGLE     Spouse name: N/A    Number of children: N/A    Years of education: N/A     Occupational History    [employer]      Social History Main Topics    Smoking status: Never Smoker    Smokeless tobacco: Never Used    Alcohol use No    Drug use: No    Sexual activity: No     Other Topics Concern    None     Social History Narrative     Family History   Problem Relation Age of Onset    Hypertension Mother     Diabetes Mother     Heart Disease Mother      CHF    Heart Disease Father      massive M. I.    Alcohol abuse Brother     Alcohol abuse Brother        OBJECTIVE:    Visit Vitals    /79 (BP 1 Location: Left arm, BP Patient Position: Sitting)    Pulse 79    Temp 98.1 °F (36.7 °C) (Oral)    Resp 18    Ht 5' 1\" (1.549 m)    Wt 165 lb 12.8 oz (75.2 kg)    SpO2 96%    BMI 31.33 kg/m2     CONSTITUTIONAL: well , well nourished, appears age appropriate  EYES: perrla, eom intact  ENMT:moist mucous membranes, pharynx clear  NECK: supple. Thyroid normal  RESPIRATORY: Chest: clear to ascultation and percussion   CARDIOVASCULAR: Heart: regular rate and rhythm  GASTROINTESTINAL: Abdomen: soft, bowel sounds active  HEMATOLOGIC: no pathological lymph nodes palpated  MUSCULOSKELETAL: Extremities: no edema, pulse 1+   INTEGUMENT: No unusual rashes or suspicious skin lesions noted. Nails appear normal.  NEUROLOGIC: non-focal exam   MENTAL STATUS: alert and oriented, appropriate affect      ASSESSMENT:  1. Infected prosthetic mesh of abdominal wall, subsequent encounter    2. Controlled type 2 diabetes mellitus without complication, without long-term current use of insulin ([address]    3. Dyslipidemia    4. Non morbid obesity due to excess calories    5. Essential hypertension        PLAN:    1. From a surgical perspective she will follow-up with her surgeon at [facility]. 2. From a diabetic perspective she will discontinue the Humalog and just take the Lantus at 8 units per day level. I suspect she probably does not need this for now. She should not develop any hypoglycemia but if this happens she is to give me a call. 3. Blood pressure is acceptable today.   She will continue her Aldactazide as prescribed. 4. She will also continue her statin which is Simvastatin 20 mg q.h.s.    5. I encourage her to remain as physically active as possible. .  Orders Placed This Encounter    CBC WITH AUTOMATED DIFF    METABOLIC PANEL, COMPREHENSIVE    HEMOGLOBIN A1C WITH EAG    APOLIPOPROTEIN B    simvastatin (ZOCOR) 20 mg tablet    oxyCODONE IR (ROXICODONE) 5 mg immediate release tablet         Follow-up Disposition:  Return in about 4 weeks (around 7/20/2017).       Jazmine Martinez MD

## 2017-06-22 NOTE — MR AVS SNAPSHOT
Visit Information Date & Time Provider Department Dept. Phone Encounter #  
 6/22/2017 10:45 AM [facility] [phone] [id number] Your Appointments 7/28/2017  9:30 AM  
Any with Allyn Hutchins MD  
[address] [place] [address] and Primary Care [facility]) Appt Note: 1 month f/u  
 [address] [facility]  
  
   
 [id number] Upcoming Health Maintenance Date Due Pneumococcal 19-64 Medium Risk (1 of 1 - PPSV23) 3/11/1976 DTaP/Tdap/Td series (1 - Tdap) 3/11/1978 FOBT Q 1 YEAR AGE 50-75 4/30/2016 MICROALBUMIN Q1 9/17/2016 FOOT EXAM Q1 3/7/2017 ZOSTER VACCINE AGE 60> 3/11/2017 EYE EXAM RETINAL OR DILATED Q1 3/24/2017 HEMOGLOBIN A1C Q6M 7/23/2017 INFLUENZA AGE 9 TO ADULT 8/1/2017 LIPID PANEL Q1 10/14/2017 BREAST CANCER SCRN MAMMOGRAM 10/19/2018 PAP AKA CERVICAL CYTOLOGY 1/6/2020 Allergies as of 6/22/2017  Review Complete On: 6/22/2017 By: Luciana Velázquez Severity Noted Reaction Type Reactions Dilaudid [Hydromorphone]  10/02/2013    Nausea Only Ultram [Tramadol]  10/02/2013    Nausea Only Current Immunizations  Never Reviewed No immunizations on file. Not reviewed this visit You Were Diagnosed With   
  
 Codes Comments Infected prosthetic mesh of abdominal wall, subsequent encounter    -  Primary ICD-10-CM: T85.79XD ICD-9-CM: V58.89 Controlled type 2 diabetes mellitus without complication, without long-term current use of insulin ([facility] [address]     ICD-10-CM: E11.9 ICD-9-CM: 250.00 Dyslipidemia     ICD-10-CM: E78.5 ICD-9-CM: 272.4 Non morbid obesity due to excess calories     ICD-10-CM: E66.09 
ICD-9-CM: 278.00 Essential hypertension     ICD-10-CM: I10 
ICD-9-CM: 401.9 Vitals BP Pulse Temp Resp Height(growth percentile) Weight(growth percentile) 115/79 (BP 1 Location: Left arm, BP Patient Position: Sitting) 79 98.1 °F (36.7 °C) (Oral) 18 5' 1\" (1.549 m) 165 lb 12.8 oz (75.2 kg) SpO2 BMI OB Status Smoking Status 96% 31.33 kg/m2 Postmenopausal Never Smoker BMI and BSA Data Body Mass Index Body Surface Area  
 31.33 kg/m 2 1.8 m 2 Preferred Pharmacy Pharmacy Name Phone [id number], [address] [place] [phone] Your Updated Medication List  
  
   
This list is accurate as of: 6/22/17  1:08 PM.  Always use your most recent med list.  
  
  
  
  
 CALCIUM PO Take  by mouth. cholecalciferol 5,000 unit capsule Commonly known as:  VITAMIN D3  
TAKE ONE CAPSULE BY MOUTH EVERY DAY  
  
 gabapentin 100 mg capsule Commonly known as:  NEURONTIN Take 100 mg by mouth three (3) times daily. glucose blood VI test strips strip Commonly known as:  FREESTYLE INSULINX TEST STRIPS Use to test blood sugar twice daily. HYDROcodone-acetaminophen 5-325 mg per tablet Commonly known as:  Dorota Safe Take 1 Tab by mouth every eight (8) hours as needed for Pain. Max Daily Amount: 3 Tabs. metFORMIN  mg tablet Commonly known as:  GLUCOPHAGE XR Take 1 Tab by mouth three (3) times daily (with meals). oxyCODONE IR 5 mg immediate release tablet Commonly known as:  Clista Mate Take 1 Tab by mouth two (2) times daily as needed for Pain. Max Daily Amount: 10 mg.  
  
 pravastatin 40 mg tablet Commonly known as:  PRAVACHOL Take 1 Tab by mouth nightly. sAXagliptin 5 mg Tab tablet Commonly known as:  ONGLYZA TAKE 1 TABLET BY MOUTH EVERY DAY  
  
 simvastatin 20 mg tablet Commonly known as:  ZOCOR Take  by mouth nightly. spironolactone-hydrochlorothiazide 25-25 mg per tablet Commonly known as:  ALDACTAZIDE TAKE 1 TABLET BY MOUTH ONCE A DAY  
  
 VITAMIN A PO Take  by mouth. VITAMIN B-12 PO Take  by mouth. Prescriptions Printed Refills  
 oxyCODONE IR (ROXICODONE) 5 mg immediate release tablet 0 Sig: Take 1 Tab by mouth two (2) times daily as needed for Pain. Max Daily Amount: 10 mg.  
 Class: Print Route: Oral  
  
We Performed the Following APOLIPOPROTEIN B [id number] CPT(R)] CBC WITH AUTOMATED DIFF [54928 CPT(R)] HEMOGLOBIN A1C WITH EAG [54859 CPT(R)] METABOLIC PANEL, COMPREHENSIVE [74637 CPT(R)] Introducing [facility] SERVICES! Dear Devang Evans: Thank you for requesting a [employer] account. Our records indicate that you already have an active [employer] account. You can access your account anytime at [email or web address] Did you know that you can access your hospital and ER discharge instructions at any time in [employer]? You can also review all of your test results from your hospital stay or ER visit. Additional Information If you have questions, please visit the Frequently Asked Questions section of the [employer] website at [email or web address] Remember, [employer] is NOT to be used for urgent needs. For medical emergencies, dial 911. Now available from your iPhone and Android! Please provide this summary of care documentation to your next provider. Your primary care clinician is listed as Petra De La Rosa. If you have any questions after today's visit, please call [phone].

## 2017-06-22 NOTE — PROGRESS NOTES
1. Have you been to the ER, urgent care clinic since your last visit? Hospitalized since your last visit? Yes Where: [facility]    2. Have you seen or consulted any other health care providers outside of the [address] [place] [address] since your last visit? Include any pap smears or colon screening.  Yes Reason for visit: pain in abdomen

## 2017-06-23 LAB
ALBUMIN SERPL-MCNC: 4.2 G/DL (ref 3.6–4.8)
ALBUMIN/GLOB SERPL: 1.3 {RATIO} (ref 1.2–2.2)
ALP SERPL-CCNC: 101 IU/L (ref 39–117)
ALT SERPL-CCNC: 36 IU/L (ref 0–32)
APO B SERPL-MCNC: 71 MG/DL (ref 54–133)
AST SERPL-CCNC: 43 IU/L (ref 0–40)
BASOPHILS # BLD AUTO: 0 X10E3/UL (ref 0–0.2)
BASOPHILS NFR BLD AUTO: 0 %
BILIRUB SERPL-MCNC: 0.3 MG/DL (ref 0–1.2)
BUN SERPL-MCNC: 9 MG/DL (ref 8–27)
BUN/CREAT SERPL: 13 (ref 12–28)
CALCIUM SERPL-MCNC: 9.7 MG/DL (ref 8.7–10.3)
CHLORIDE SERPL-SCNC: 98 MMOL/L (ref 96–106)
CO2 SERPL-SCNC: 26 MMOL/L (ref 18–29)
CREAT SERPL-MCNC: 0.71 MG/DL (ref 0.57–1)
EOSINOPHIL # BLD AUTO: 0 X10E3/UL (ref 0–0.4)
EOSINOPHIL NFR BLD AUTO: 1 %
ERYTHROCYTE [DISTWIDTH] IN BLOOD BY AUTOMATED COUNT: 17.5 % (ref 12.3–15.4)
EST. AVERAGE GLUCOSE BLD GHB EST-MCNC: 137 MG/DL
GLOBULIN SER CALC-MCNC: 3.3 G/DL (ref 1.5–4.5)
GLUCOSE SERPL-MCNC: 92 MG/DL (ref 65–99)
HBA1C MFR BLD: 6.4 % (ref 4.8–5.6)
HCT VFR BLD AUTO: 34.8 % (ref 34–46.6)
HGB BLD-MCNC: 10.4 G/DL (ref 11.1–15.9)
IMM GRANULOCYTES # BLD: 0 X10E3/UL (ref 0–0.1)
IMM GRANULOCYTES NFR BLD: 0 %
LYMPHOCYTES # BLD AUTO: 2.4 X10E3/UL (ref 0.7–3.1)
LYMPHOCYTES NFR BLD AUTO: 38 %
MCH RBC QN AUTO: 24.1 PG (ref 26.6–33)
MCHC RBC AUTO-ENTMCNC: 29.9 G/DL (ref 31.5–35.7)
MCV RBC AUTO: 81 FL (ref 79–97)
MONOCYTES # BLD AUTO: 0.6 X10E3/UL (ref 0.1–0.9)
MONOCYTES NFR BLD AUTO: 10 %
NEUTROPHILS # BLD AUTO: 3.2 X10E3/UL (ref 1.4–7)
NEUTROPHILS NFR BLD AUTO: 51 %
PLATELET # BLD AUTO: 388 X10E3/UL (ref 150–379)
POTASSIUM SERPL-SCNC: 4.3 MMOL/L (ref 3.5–5.2)
PROT SERPL-MCNC: 7.5 G/DL (ref 6–8.5)
RBC # BLD AUTO: 4.31 X10E6/UL (ref 3.77–5.28)
SODIUM SERPL-SCNC: 139 MMOL/L (ref 134–144)
WBC # BLD AUTO: 6.3 X10E3/UL (ref 3.4–10.8)

## 2017-06-23 RX ORDER — PEN NEEDLE, DIABETIC 31 GX3/16"
NEEDLE, DISPOSABLE MISCELLANEOUS
Qty: 100 PEN NEEDLE | Refills: 11 | Status: SHIPPED | OUTPATIENT
Start: 2017-06-23

## 2017-07-28 ENCOUNTER — OFFICE VISIT (OUTPATIENT)
Dept: INTERNAL MEDICINE CLINIC | Age: 62
End: 2017-07-28

## 2017-07-28 VITALS
TEMPERATURE: 97.9 F | OXYGEN SATURATION: 98 % | DIASTOLIC BLOOD PRESSURE: 77 MMHG | HEART RATE: 65 BPM | WEIGHT: 171.8 LBS | BODY MASS INDEX: 32.44 KG/M2 | HEIGHT: 61 IN | RESPIRATION RATE: 16 BRPM | SYSTOLIC BLOOD PRESSURE: 118 MMHG

## 2017-07-28 DIAGNOSIS — R09.89 BRUIT OF RIGHT CAROTID ARTERY: ICD-10-CM

## 2017-07-28 DIAGNOSIS — M77.9 TENDONITIS/CAPSULITIS/PERIARTHRITIS: ICD-10-CM

## 2017-07-28 DIAGNOSIS — E11.9 CONTROLLED TYPE 2 DIABETES MELLITUS WITHOUT COMPLICATION, WITHOUT LONG-TERM CURRENT USE OF INSULIN (HCC): Primary | ICD-10-CM

## 2017-07-28 DIAGNOSIS — E78.5 DYSLIPIDEMIA: ICD-10-CM

## 2017-07-28 DIAGNOSIS — E66.09 NON MORBID OBESITY DUE TO EXCESS CALORIES: ICD-10-CM

## 2017-07-28 LAB — GLUCOSE POC: 84 MG/DL

## 2017-07-28 NOTE — PROGRESS NOTES
Chief Complaint   Patient presents with   [facility] Follow Up     1 MONTH F/U FOR ABDOMINAL INFECTION     1. Have you been to the ER, urgent care clinic since your last visit? Hospitalized since your last visit? No    2. Have you seen or consulted any other health care providers outside of the [facility] since your last visit? Include any pap smears or colon screening.  No  Results for orders placed or performed in visit on 07/28/17   AMB POC GLUCOSE BLOOD, BY GLUCOSE MONITORING DEVICE   Result Value Ref Range    Glucose POC 84 mg/dL

## 2017-07-28 NOTE — PROGRESS NOTES
[address] [facility]  [place] [address]  [address] [place] [address]  Phone:  [phone]  Fax: [phone]       Chief Complaint   Patient presents with   [facility] Follow Up     1 MONTH F/U FOR ABDOMINAL INFECTION   . SUBJECTIVE:    Pat Olivera is a 63 y.o. female Comes in for return visit stating that she has done fairly well. She is having no further abdominal pain. She continues with her colostomy. This will be taken down in the very near future. She is actively being followed by her general surgeons at [facility]. Her diabetes has indeed been doing well. She is taking Lantus 8 units, but continues to take her Prandial Insulin periodically. She has not had any symptomatic hypoglycemia. Average fasting blood sugars in the 80's. There is a past history of primary hypertension and dyslipidemia. She is on her statin because of subclinical disease noted on carotid Dopplers with moderate plaquing noted. Obviously no obstruction. This was non-obstructive. Current Outpatient Prescriptions   Medication Sig Dispense Refill    Insulin Needles, Disposable, (KEYANA PEN NEEDLE) 32 gauge x 5/32\" ndle Use to inject insulin once daily 100 Pen Needle 11    oxyCODONE IR (ROXICODONE) 5 mg immediate release tablet Take 1 Tab by mouth two (2) times daily as needed for Pain. Max Daily Amount: 10 mg. 10 Tab 0    HYDROcodone-acetaminophen (NORCO) 5-325 mg per tablet Take 1 Tab by mouth every eight (8) hours as needed for Pain. Max Daily Amount: 3 Tabs. 40 Tab 0    gabapentin (NEURONTIN) 100 mg capsule Take 100 mg by mouth three (3) times daily.  glucose blood VI test strips (FREESTYLE INSULINX TEST STRIPS) strip Use to test blood sugar twice daily. 50 Strip 11    pravastatin (PRAVACHOL) 40 mg tablet Take 1 Tab by mouth nightly.  30 Tab 11    saxagliptin (ONGLYZA) 5 mg tab tablet TAKE 1 TABLET BY MOUTH EVERY DAY 30 Tab 5    spironolactone-hydrochlorothiazide (ALDACTAZIDE) 25-25 mg per tablet TAKE 1 TABLET BY MOUTH ONCE A DAY 30 Tab 11    CYANOCOBALAMIN, VITAMIN B-12, (VITAMIN B-12 PO) Take  by mouth.  CALCIUM PO Take  by mouth.  simvastatin (ZOCOR) 20 mg tablet Take  by mouth nightly.  metFORMIN ER (GLUCOPHAGE XR) 500 mg tablet Take 1 Tab by mouth three (3) times daily (with meals). 90 Tab 11    cholecalciferol (VITAMIN D3) 5,000 unit capsule TAKE ONE CAPSULE BY MOUTH EVERY DAY  11    VITAMIN A PO Take  by mouth.        Past Medical History:   Diagnosis Date    Musculoskeletal disorder      Past Surgical History:   Procedure Laterality Date    HX  SECTION      HX CHOLECYSTECTOMY      HX GASTRIC BYPASS      HX HERNIA REPAIR Bilateral     HX SMALL BOWEL RESECTION      HX TUBAL LIGATION       Allergies   Allergen Reactions    Dilaudid [Hydromorphone] Nausea Only    Ultram [Tramadol] Nausea Only         REVIEW OF SYSTEMS:  General: negative for - chills or fever  ENT: negative for - headaches, nasal congestion or tinnitus  Respiratory: negative for - cough, hemoptysis, shortness of breath or wheezing  Cardiovascular : negative for - chest pain, edema, palpitations or shortness of breath  Gastrointestinal: negative for - abdominal pain, blood in stools, heartburn or nausea/vomiting  Genito-Urinary: no dysuria, trouble voiding, or hematuria  Musculoskeletal: negative for - gait disturbance, joint pain, joint stiffness or joint swelling  Neurological: no TIA or stroke symptoms  Hematologic: no bruises, no bleeding, no swollen glands  Integument: no lumps, mole changes, nail changes or rash  Endocrine: no malaise/lethargy or unexpected weight changes      Social History     Social History    Marital status: SINGLE     Spouse name: N/A    Number of children: N/A    Years of education: N/A     Occupational History    [employer]      Social History Main Topics    Smoking status: Never Smoker    Smokeless tobacco: Never Used    Alcohol use No    Drug use: No    Sexual activity: No     Other Topics Concern    None     Social History Narrative     Family History   Problem Relation Age of Onset    Hypertension Mother     Diabetes Mother     Heart Disease Mother      CHF    Heart Disease Father      massive M. I.    Alcohol abuse Brother     Alcohol abuse Brother        OBJECTIVE:    Visit Vitals    /77 (BP 1 Location: Left arm, BP Patient Position: Sitting)    Pulse 65    Temp 97.9 °F (36.6 °C) (Oral)    Resp 16    Ht 5' 1\" (1.549 m)    Wt 171 lb 12.8 oz (77.9 kg)    LMP  (LMP Unknown)    SpO2 98%    BMI 32.46 kg/m2     CONSTITUTIONAL: well , well nourished, appears age appropriate  EYES: perrla, eom intact  ENMT:moist mucous membranes, pharynx clear  NECK: supple. Thyroid normal  RESPIRATORY: Chest: clear to ascultation and percussion   CARDIOVASCULAR: Heart: regular rate and rhythm  GASTROINTESTINAL: Abdomen: soft, bowel sounds active, colostomy epigastric area  HEMATOLOGIC: no pathological lymph nodes palpated  MUSCULOSKELETAL: Extremities: no edema, pulse 1+   INTEGUMENT: No unusual rashes or suspicious skin lesions noted. Nails appear normal.  NEUROLOGIC: non-focal exam   MENTAL STATUS: alert and oriented, appropriate affect      ASSESSMENT:  1. Controlled type 2 diabetes mellitus without complication, without long-term current use of insulin ([address]    2. Dyslipidemia    3. Bruit of right carotid artery    4. Non morbid obesity due to excess calories    5. Tendonitis/capsulitis/periarthritis        PLAN:    1. The diabetes is doing well. She will reduce her Lantus from 8 units to 6 units. I will continue to titrate downward assuming blood sugars remain stable. 2. She will continue statin as prescribed. Efficacy and compliance have previously been assessed. 3. She does have a right carotid bruit indicating subclinical vascular disease. Statin usage will indeed continue.   4. She has gained a few pounds since her last visit, but this is reasonable given the anticipated surgery. 5. She will have a takedown of her ostomy within the next two weeks or so. .  Orders Placed This Encounter    AMB POC GLUCOSE BLOOD, BY GLUCOSE MONITORING DEVICE         Follow-up Disposition:  Return in about 6 weeks (around 9/8/2017).       Lencho Mills MD

## 2017-10-18 RX ORDER — INSULIN GLARGINE 100 [IU]/ML
INJECTION, SOLUTION SUBCUTANEOUS
Qty: 15 ML | Refills: 11 | Status: SHIPPED | OUTPATIENT
Start: 2017-10-18

## 2017-12-07 ENCOUNTER — OFFICE VISIT (OUTPATIENT)
Dept: INTERNAL MEDICINE CLINIC | Age: 62
End: 2017-12-07

## 2017-12-07 VITALS
WEIGHT: 164.3 LBS | SYSTOLIC BLOOD PRESSURE: 113 MMHG | BODY MASS INDEX: 31.02 KG/M2 | HEIGHT: 61 IN | HEART RATE: 82 BPM | DIASTOLIC BLOOD PRESSURE: 75 MMHG | RESPIRATION RATE: 18 BRPM | TEMPERATURE: 98 F | OXYGEN SATURATION: 98 %

## 2017-12-07 DIAGNOSIS — E78.5 DYSLIPIDEMIA: ICD-10-CM

## 2017-12-07 DIAGNOSIS — M75.02 ADHESIVE CAPSULITIS OF LEFT SHOULDER: Primary | ICD-10-CM

## 2017-12-07 DIAGNOSIS — R53.81 PHYSICAL DECONDITIONING: ICD-10-CM

## 2017-12-07 DIAGNOSIS — I10 ESSENTIAL HYPERTENSION: ICD-10-CM

## 2017-12-07 DIAGNOSIS — E11.9 CONTROLLED TYPE 2 DIABETES MELLITUS WITHOUT COMPLICATION, WITHOUT LONG-TERM CURRENT USE OF INSULIN (HCC): ICD-10-CM

## 2017-12-07 DIAGNOSIS — R09.89 BRUIT OF RIGHT CAROTID ARTERY: ICD-10-CM

## 2017-12-07 NOTE — MR AVS SNAPSHOT
Visit Information Date & Time Provider Department Dept. Phone Encounter #  
 12/7/2017 12:15 PM [facility] [phone] [id number] Upcoming Health Maintenance Date Due FOBT Q 1 YEAR AGE 50-75 4/30/2016 MICROALBUMIN Q1 9/17/2016 FOOT EXAM Q1 3/7/2017 EYE EXAM RETINAL OR DILATED Q1 3/24/2017 LIPID PANEL Q1 10/14/2017 HEMOGLOBIN A1C Q6M 12/22/2017 BREAST CANCER SCRN MAMMOGRAM 10/19/2018 PAP AKA CERVICAL CYTOLOGY 1/6/2020 DTaP/Tdap/Td series (2 - Td) 7/28/2027 Allergies as of 12/7/2017  Review Complete On: 12/7/2017 By: Denita Lao Severity Noted Reaction Type Reactions Dilaudid [Hydromorphone]  10/02/2013    Nausea Only Ultram [Tramadol]  10/02/2013    Nausea Only Current Immunizations  Never Reviewed No immunizations on file. Not reviewed this visit You Were Diagnosed With   
  
 Codes Comments Adhesive capsulitis of left shoulder    -  Primary ICD-10-CM: M75.02 
ICD-9-CM: 726.0 Controlled type 2 diabetes mellitus without complication, without long-term current use of insulin ([facility]     ICD-10-CM: E11.9 ICD-9-CM: 250.00 Dyslipidemia     ICD-10-CM: E78.5 ICD-9-CM: 272.4 Essential hypertension     ICD-10-CM: I10 
ICD-9-CM: 401.9 Bruit of right carotid artery     ICD-10-CM: R09.89 ICD-9-CM: 206. 9 Vitals BP Pulse Temp Resp Height(growth percentile) Weight(growth percentile) 113/75 (BP 1 Location: Left arm, BP Patient Position: Sitting) 82 98 °F (36.7 °C) (Oral) 18 5' 1\" (1.549 m) 164 lb 4.8 oz (74.5 kg) LMP SpO2 BMI OB Status Smoking Status (LMP Unknown) 98% 31.04 kg/m2 Postmenopausal Never Smoker BMI and BSA Data Body Mass Index Body Surface Area 31.04 kg/m 2 1.79 m 2 Preferred Pharmacy Pharmacy Name Phone [id number], [address] [facility] [place] [phone] Your Updated Medication List  
  
   
This list is accurate as of: 12/7/17  3:01 PM.  Always use your most recent med list.  
  
  
  
  
 CALCIUM PO Take  by mouth. cholecalciferol 5,000 unit capsule Commonly known as:  VITAMIN D3  
TAKE ONE CAPSULE BY MOUTH EVERY DAY  
  
 gabapentin 100 mg capsule Commonly known as:  NEURONTIN Take 100 mg by mouth three (3) times daily. glucose blood VI test strips strip Commonly known as:  FREESTYLE INSULINX TEST STRIPS Use to test blood sugar three times daily. Dx.e11.9 HYDROcodone-acetaminophen 5-325 mg per tablet Commonly known as:  Lynnetta Ward Take 1 Tab by mouth every eight (8) hours as needed for Pain. Max Daily Amount: 3 Tabs. insulin glargine 100 unit/mL (3 mL) Inpn Commonly known as:  Georgiana Sosa Inject 6 units every night Insulin Needles (Disposable) 32 gauge x 5/32\" Ndle Commonly known as:  Katelin Pen Needle Use to inject insulin once daily  
  
 metFORMIN  mg tablet Commonly known as:  GLUCOPHAGE XR Take 1 Tab by mouth three (3) times daily (with meals). oxyCODONE IR 5 mg immediate release tablet Commonly known as:  Gevena Landsberg Take 1 Tab by mouth two (2) times daily as needed for Pain. Max Daily Amount: 10 mg.  
  
 pravastatin 40 mg tablet Commonly known as:  PRAVACHOL Take 1 Tab by mouth nightly. sAXagliptin 5 mg Tab tablet Commonly known as:  ONGLYZA TAKE 1 TABLET BY MOUTH EVERY DAY  
  
 simvastatin 20 mg tablet Commonly known as:  ZOCOR Take  by mouth nightly. spironolactone-hydrochlorothiazide 25-25 mg per tablet Commonly known as:  ALDACTAZIDE TAKE 1 TABLET BY MOUTH ONCE A DAY  
  
 VITAMIN A PO Take  by mouth. VITAMIN B-12 PO Take  by mouth. We Performed the Following APOLIPOPROTEIN B [id number] CPT(R)] CBC WITH AUTOMATED DIFF [07778 CPT(R)] HEMOGLOBIN A1C WITH EAG [17938 CPT(R)] Introducing [facility] SERVICES! Dear Mark Presume: Thank you for requesting a [employer] account. Our records indicate that you already have an active [employer] account. You can access your account anytime at [email or web address] Did you know that you can access your hospital and ER discharge instructions at any time in [employer]? You can also review all of your test results from your hospital stay or ER visit. Additional Information If you have questions, please visit the Frequently Asked Questions section of the [employer] website at [email or web address] Remember, [employer] is NOT to be used for urgent needs. For medical emergencies, dial 911. Now available from your iPhone and Android! Please provide this summary of care documentation to your next provider. Your primary care clinician is listed as Petra De La Rosa. If you have any questions after today's visit, please call [phone].

## 2017-12-07 NOTE — PROGRESS NOTES
Chief Complaint   Patient presents with    Diabetes     follow up with diabetes and labs recently drawn      1. Have you been to the ER, urgent care clinic since your last visit? Hospitalized since your last visit? No    2. Have you seen or consulted any other health care providers outside of the [address] [place] [address] since your last visit? Include any pap smears or colon screening.  No

## 2017-12-08 LAB
APO B SERPL-MCNC: 58 MG/DL (ref 54–133)
BASOPHILS # BLD AUTO: 0 X10E3/UL (ref 0–0.2)
BASOPHILS NFR BLD AUTO: 0 %
EOSINOPHIL # BLD AUTO: 0 X10E3/UL (ref 0–0.4)
EOSINOPHIL NFR BLD AUTO: 1 %
ERYTHROCYTE [DISTWIDTH] IN BLOOD BY AUTOMATED COUNT: 20.2 % (ref 12.3–15.4)
EST. AVERAGE GLUCOSE BLD GHB EST-MCNC: 108 MG/DL
HBA1C MFR BLD: 5.4 % (ref 4.8–5.6)
HCT VFR BLD AUTO: 32.5 % (ref 34–46.6)
HGB BLD-MCNC: 9.7 G/DL (ref 11.1–15.9)
IMM GRANULOCYTES # BLD: 0 X10E3/UL (ref 0–0.1)
IMM GRANULOCYTES NFR BLD: 0 %
LYMPHOCYTES # BLD AUTO: 2.3 X10E3/UL (ref 0.7–3.1)
LYMPHOCYTES NFR BLD AUTO: 40 %
MCH RBC QN AUTO: 23.5 PG (ref 26.6–33)
MCHC RBC AUTO-ENTMCNC: 29.8 G/DL (ref 31.5–35.7)
MCV RBC AUTO: 79 FL (ref 79–97)
MONOCYTES # BLD AUTO: 0.4 X10E3/UL (ref 0.1–0.9)
MONOCYTES NFR BLD AUTO: 6 %
NEUTROPHILS # BLD AUTO: 3 X10E3/UL (ref 1.4–7)
NEUTROPHILS NFR BLD AUTO: 53 %
PLATELET # BLD AUTO: 366 X10E3/UL (ref 150–379)
RBC # BLD AUTO: 4.12 X10E6/UL (ref 3.77–5.28)
WBC # BLD AUTO: 5.8 X10E3/UL (ref 3.4–10.8)

## 2017-12-10 PROBLEM — R53.81 PHYSICAL DECONDITIONING: Status: ACTIVE | Noted: 2017-12-10

## 2017-12-10 RX ORDER — LIDOCAINE HYDROCHLORIDE 10 MG/ML
5 INJECTION, SOLUTION EPIDURAL; INFILTRATION; INTRACAUDAL; PERINEURAL ONCE
Qty: 5 ML | Refills: 0
Start: 2017-12-10 | End: 2017-12-10

## 2017-12-10 RX ORDER — TRIAMCINOLONE ACETONIDE 40 MG/ML
40 INJECTION, SUSPENSION INTRA-ARTICULAR; INTRAMUSCULAR ONCE
Qty: 1 ML | Refills: 0
Start: 2017-12-10 | End: 2017-12-10

## 2017-12-10 NOTE — PROGRESS NOTES
[address] [place] [address] and Primary Care  [place] [address]  [address]  [place] [id number]  Phone:  [phone]  Fax: [phone]    Chief Complaint   Patient presents with    Diabetes     follow up with diabetes and labs recently drawn        SUBJECTIVE:    Romero Valderrama is a 63 y.o. female Comes in for return visit stating that she has done well. She was most recently hospitalized having had a take down of her colostomy. The procedure was supposed to take many hours but it only took three. They were anticipating significant adhesions as occurred with her initial procedure. She is now devoid of any mesh. Her mesh became infected before which led to the tremendous septic episode with the need for secondary closure in an extended care facility. Her diabetes has been doing quite well. She has had no meaningful weight gain since I last saw her. She has a past history of primary hypertension and dyslipidemia. Her current increased cardiovascular risk is manifesting itself as significant carotid plaquing. She has had no CNS symptoms. Finally, she has noted an increase in pain in her left shoulder. This has gotten progressively worse. Current Outpatient Prescriptions   Medication Sig Dispense Refill    triamcinolone acetonide (KENALOG) 40 mg/mL injection 1 mL by IntraMUSCular route once for 1 dose. 1 mL 0    lidocaine, PF, (XYLOCAINE) 10 mg/mL (1 %) injection 5 mL by Intra artICUlar route once for 1 dose. 5 mL 0    glucose blood VI test strips (FREESTYLE INSULINX TEST STRIPS) strip Use to test blood sugar three times daily.  Dx.e11.9 100 Strip 11    insulin glargine (LANTUS,BASAGLAR) 100 unit/mL (3 mL) inpn Inject 6 units every night 15 mL 11    spironolactone-hydrochlorothiazide (ALDACTAZIDE) 25-25 mg per tablet TAKE 1 TABLET BY MOUTH ONCE A DAY 30 Tab 11    Insulin Needles, Disposable, (KEYANA PEN NEEDLE) 32 gauge x 5/32\" ndle Use to inject insulin once daily 100 Pen Needle 11    oxyCODONE IR (ROXICODONE) 5 mg immediate release tablet Take 1 Tab by mouth two (2) times daily as needed for Pain. Max Daily Amount: 10 mg. 10 Tab 0    HYDROcodone-acetaminophen (NORCO) 5-325 mg per tablet Take 1 Tab by mouth every eight (8) hours as needed for Pain. Max Daily Amount: 3 Tabs. 40 Tab 0    gabapentin (NEURONTIN) 100 mg capsule Take 100 mg by mouth three (3) times daily.  pravastatin (PRAVACHOL) 40 mg tablet Take 1 Tab by mouth nightly. 30 Tab 11    cholecalciferol (VITAMIN D3) 5,000 unit capsule TAKE ONE CAPSULE BY MOUTH EVERY DAY  11    VITAMIN A PO Take  by mouth.  CYANOCOBALAMIN, VITAMIN B-12, (VITAMIN B-12 PO) Take  by mouth.  simvastatin (ZOCOR) 20 mg tablet Take  by mouth nightly.  metFORMIN ER (GLUCOPHAGE XR) 500 mg tablet Take 1 Tab by mouth three (3) times daily (with meals). 90 Tab 11    saxagliptin (ONGLYZA) 5 mg tab tablet TAKE 1 TABLET BY MOUTH EVERY DAY 30 Tab 5    CALCIUM PO Take  by mouth.        Past Medical History:   Diagnosis Date    Musculoskeletal disorder      Past Surgical History:   Procedure Laterality Date    HX  SECTION      HX CHOLECYSTECTOMY      HX GASTRIC BYPASS      HX HERNIA REPAIR Bilateral     HX SMALL BOWEL RESECTION      HX TUBAL LIGATION       Allergies   Allergen Reactions    Dilaudid [Hydromorphone] Nausea Only    Ultram [Tramadol] Nausea Only       REVIEW OF SYSTEMS:  General: negative for - chills or fever  ENT: negative for - headaches, nasal congestion or tinnitus  Respiratory: negative for - cough, hemoptysis, shortness of breath or wheezing  Cardiovascular : negative for - chest pain, edema, palpitations or shortness of breath  Gastrointestinal: negative for - abdominal pain, blood in stools, heartburn or nausea/vomiting  Genito-Urinary: no dysuria, trouble voiding, or hematuria  Musculoskeletal: negative for - gait disturbance, joint pain, joint stiffness or joint swelling  Neurological: no TIA or stroke symptoms  Hematologic: no bruises, no bleeding, no swollen glands  Integument: no lumps, mole changes, nail changes or rash  Endocrine:no malaise/lethargy or unexpected weight changes      Social History     Social History    Marital status: SINGLE     Spouse name: N/A    Number of children: N/A    Years of education: N/A     Occupational History    [employer]      Social History Main Topics    Smoking status: Never Smoker    Smokeless tobacco: Never Used    Alcohol use No    Drug use: No    Sexual activity: No     Other Topics Concern    None     Social History Narrative     Family History   Problem Relation Age of Onset    Hypertension Mother     Diabetes Mother     Heart Disease Mother      CHF    Heart Disease Father      massive M. I.    Alcohol abuse Brother     Alcohol abuse Brother        OBJECTIVE:     Visit Vitals    /75 (BP 1 Location: Left arm, BP Patient Position: Sitting)    Pulse 82    Temp 98 °F (36.7 °C) (Oral)    Resp 18    Ht 5' 1\" (1.549 m)    Wt 164 lb 4.8 oz (74.5 kg)    LMP  (LMP Unknown)    SpO2 98%    BMI 31.04 kg/m2     CONSTITUTIONAL: well , well nourished, appears age appropriate  EYES: perrla, eom intact  ENMT:moist mucous membranes, pharynx clear  NECK: supple. Thyroid normal  RESPIRATORY: Chest: clear to ascultation and percussion   CARDIOVASCULAR: Heart: regular rate and rhythm  GASTROINTESTINAL: Abdomen: soft, bowel sounds active  HEMATOLOGIC: no pathological lymph nodes palpated  MUSCULOSKELETAL: Extremities: no edema, pulse 1+, moderate pain elicited to range of motion left shoulder  INTEGUMENT: No unusual rashes or suspicious skin lesions noted. Nails appear normal.  NEUROLOGIC: non-focal exam   MENTAL STATUS: alert and oriented, appropriate affect     ASSESSMENT:   1. Adhesive capsulitis of left shoulder    2. Controlled type 2 diabetes mellitus without complication, without long-term current use of insulin ([address]    3. Dyslipidemia    4. Essential hypertension    5. Bruit of right carotid artery    6. Physical deconditioning        PLAN:    1. The patient has pericapsulitis of her left shoulder. Procedure:  Using sterile technique, I injected Kenalog 40 mg and 0.5 cc of xylocaine 1% into the into the posterior aspect of the left shoulder. She tolerated the procedure well and post-injection noted significant improvement. 2. Hopefully her diabetesis doing well but I will await the results of the hemoglobin A1c. She is currently on Lantus only. She wishes to have a corrective insulin but I see no reason to do this. If anything, she would be an ideal candidate for a GLP-1 agonist absent insulin. I will await the results of her point value, however. 3. Blood pressure is excellent today, no adjustments are made. 4. She will continue statin as prescribed. 5. She continues with the carotid bruits. There is no reason to repeat those at this point. 6. I encouraged her to increase her physical activity. She is deconditioned and needs to significantly increase her activity to improve her metabolic status. 7. I also strongly advised her to minimize weight gain. Fortunately, this is being done. Emphasis is placed on reducing the consumption of processed carbohydrates. .  Orders Placed This Encounter    CBC WITH AUTOMATED DIFF    APOLIPOPROTEIN B    HEMOGLOBIN A1C WITH EAG    triamcinolone acetonide (KENALOG) 40 mg/mL injection    lidocaine, PF, (XYLOCAINE) 10 mg/mL (1 %) injection         ATTENTION:   This medical record was transcribed using an electronic medical records system. Although proofread, it may and can contain electronic and spelling errors. Other human spelling and other errors may be present. Corrections may be executed at a later time. Please feel free to contact us for any clarifications as needed. Follow-up Disposition:  Return in about 3 months (around 3/7/2018).       [facility] Brandon Mishra MD

## 2018-02-10 RX ORDER — GABAPENTIN 100 MG/1
100 CAPSULE ORAL 3 TIMES DAILY
Qty: 90 CAP | Refills: 11 | Status: SHIPPED | OUTPATIENT
Start: 2018-02-10 | End: 2018-04-09 | Stop reason: SDUPTHER

## 2018-02-28 ENCOUNTER — OFFICE VISIT (OUTPATIENT)
Dept: SURGERY | Age: 63
End: 2018-02-28

## 2018-02-28 VITALS
HEIGHT: 61 IN | SYSTOLIC BLOOD PRESSURE: 123 MMHG | WEIGHT: 180.6 LBS | HEART RATE: 70 BPM | OXYGEN SATURATION: 98 % | BODY MASS INDEX: 34.1 KG/M2 | TEMPERATURE: 97.1 F | DIASTOLIC BLOOD PRESSURE: 65 MMHG

## 2018-02-28 DIAGNOSIS — Z98.891 HISTORY OF C-SECTION: ICD-10-CM

## 2018-02-28 DIAGNOSIS — N95.1 SYMPTOMATIC MENOPAUSAL OR FEMALE CLIMACTERIC STATES: Primary | ICD-10-CM

## 2018-02-28 PROBLEM — E11.40 TYPE 2 DIABETES MELLITUS WITH DIABETIC NEUROPATHY (HCC): Status: ACTIVE | Noted: 2018-02-28

## 2018-02-28 NOTE — PROGRESS NOTES
SUBJECTIVE: Tr Dodd is a 63 y.o. female who presents with desire for annual well woman exam. No LMP recorded (lmp unknown). Patient is postmenopausal.     History of endometriosis now in remission. History of gastric bypass surgery. History of intestinal adhesions and intestinal obstruction on more than one occasion. Menstrual period 2009 and also bled in mid-August as well. Pelvic ultrasound 2009 revealed uterus to be within normal limits of size, measuring 5.2 x 6.3 x 9.5 cm with multiple small fibroids, the largest measuring 2.5 x 3.2 x 3.0 cm and fundal. The endometrial stripe was 1.1 cm. The right ovary was not visualized. The left ovary measured 1.3 x 2.1 x 2.4 cm. No complaints today. Allergies   Allergen Reactions    Dilaudid [Hydromorphone] Nausea Only    Ultram [Tramadol] Nausea Only      Past Medical History:   Diagnosis Date    Musculoskeletal disorder      Past Surgical History:   Procedure Laterality Date    HX  SECTION      HX CHOLECYSTECTOMY      HX GASTRIC BYPASS      HX HERNIA REPAIR Bilateral     HX SMALL BOWEL RESECTION      HX TUBAL LIGATION       OB History      Para Term  AB Living    2 1   1     SAB TAB Ectopic Molar Multiple Live Births                 Family History   Problem Relation Age of Onset    Hypertension Mother     Diabetes Mother     Heart Disease Mother      CHF    Heart Disease Father      massive M. I.    Alcohol abuse Brother     Alcohol abuse Brother      Social History     Social History    Marital status: SINGLE     Spouse name: N/A    Number of children: N/A    Years of education: N/A     Occupational History    [employer]      Social History Main Topics    Smoking status: Never Smoker    Smokeless tobacco: Never Used    Alcohol use No    Drug use: No    Sexual activity: No     Other Topics Concern    Not on file     Social History Narrative     Current Outpatient Prescriptions Medication Sig Dispense Refill    PRENATAL VIT 26/QGRA FUM/FOLIC (PRENATAL FORMULA PO) Take  by mouth.  gabapentin (NEURONTIN) 100 mg capsule Take 1 Cap by mouth three (3) times daily. 90 Cap 11    glucose blood VI test strips (FREESTYLE INSULINX TEST STRIPS) strip Use to test blood sugar three times daily. Dx.e11.9 100 Strip 11    insulin glargine (LANTUS,BASAGLAR) 100 unit/mL (3 mL) inpn Inject 6 units every night 15 mL 11    spironolactone-hydrochlorothiazide (ALDACTAZIDE) 25-25 mg per tablet TAKE 1 TABLET BY MOUTH ONCE A DAY 30 Tab 11    Insulin Needles, Disposable, (KEYANA PEN NEEDLE) 32 gauge x 5/32\" ndle Use to inject insulin once daily 100 Pen Needle 11    simvastatin (ZOCOR) 20 mg tablet Take  by mouth nightly.  HYDROcodone-acetaminophen (NORCO) 5-325 mg per tablet Take 1 Tab by mouth every eight (8) hours as needed for Pain. Max Daily Amount: 3 Tabs. 40 Tab 0    pravastatin (PRAVACHOL) 40 mg tablet Take 1 Tab by mouth nightly. 30 Tab 11    cholecalciferol (VITAMIN D3) 5,000 unit capsule TAKE ONE CAPSULE BY MOUTH EVERY DAY  11    VITAMIN A PO Take  by mouth.  CYANOCOBALAMIN, VITAMIN B-12, (VITAMIN B-12 PO) Take  by mouth.  CALCIUM PO Take  by mouth. Review of Systems:   Constitutional: No weight change, chills or fever, anorexia, weakness or sleep disturbance . Cardiovascular: No chest pain, shortness of breath, or palpitations . Respiratory: No cough, shortness of breath, hemoptysis, or orthopnea . Neurologic: No syncope, headaches or seizures . Hematologic: No easy bruising or unusual bleeding . Psychiatric: No insomnia, confusion, depression, or anxiety . GI:No nausea and vomiting, diarrhea or constipation  . : See HPI . Musculoskeletal: No joint pain or muscle pain . Endocrine: No polydipsia, polyuria, cold intolerance, excessive fatigue, or sleep disturbance . Integumentary: No breast pain, lumps, nipple discharge, or axillary lumps .     Objective:     Visit Vitals    /65    Pulse 70    Temp 97.1 °F (36.2 °C) (Temporal)    Ht 5' 1\" (1.549 m)    Wt 180 lb 9.6 oz (81.9 kg)    LMP  (LMP Unknown)    SpO2 98%    BMI 34.12 kg/m2       General:  alert, cooperative, no distress, appears stated age   Skin:  no rash or abnormalities   Eyes: negative   Mouth: MMM no lesions   Lymph Nodes:  Cervical, supraclavicular, and axillary nodes normal.   Breast Exam: normal appearance, no masses or tenderness    Lungs:  clear to auscultation bilaterally   Heart:  regular rate and rhythm   Abdomen: soft, non-tender. Bowel sounds normal. No masses,  no organomegaly   Back:  Costovertebral angle tenderness absent   Genitourinary: Pelvic exam: VULVA: normal appearing vulva with no masses, tenderness or lesions, VAGINA: normal appearing vagina with normal color and discharge, no lesions, CERVIX: normal appearing cervix without discharge or lesions, UTERUS: uterus is normal size, shape, consistency and nontender, ADNEXA: normal adnexa in size, nontender and no masses. Examination restricted by obesity    Extremities:  extremities normal, atraumatic, no cyanosis or edema   Neurologic:  sensation grossly intact. Psychiatric:  non focal     ASSESSMENT:      ICD-10-CM ICD-9-CM    1. Symptomatic menopausal or female climacteric states N95.1 627.2    2. History of  Z98.891 V45.89      Mammogram done already at [address] [place] [address]. Follow-up Disposition:  Return in about 1 year (around 2019), or if symptoms worsen or fail to improve.

## 2018-02-28 NOTE — MR AVS SNAPSHOT
[address] [address] [phone] Patient: Brandy Hammonds MRN: [id number] OCF: Visit Information Date & Time Provider Department Dept. Phone Encounter #  
 2018 10:15 AM Jackson Starks, [facility] [address] [id number] Follow-up Instructions Return in about 1 year (around 2019), or if symptoms worsen or fail to improve. Your Appointments 3/8/2018  9:45 AM  
Any with Hugo Reynolds MD  
[address] [place] [address] and Primary Care [address] [place] [address]) Appt Note: follow up 3mnth diabetes [address] [facility] [place]  
  
   
 [address] [id number] Upcoming Health Maintenance Date Due FOBT Q 1 YEAR AGE 50-75 2016 MICROALBUMIN Q1 2016 FOOT EXAM Q1 3/7/2017 EYE EXAM RETINAL OR DILATED Q1 3/24/2017 LIPID PANEL Q1 10/14/2017 HEMOGLOBIN A1C Q6M 2018 BREAST CANCER SCRN MAMMOGRAM 10/19/2018 PAP AKA CERVICAL CYTOLOGY 2020 DTaP/Tdap/Td series (2 - Td) 2027 Allergies as of 2018  Review Complete On: 2018 By: Jackson Starks MD  
  
 Severity Noted Reaction Type Reactions Dilaudid [Hydromorphone]  10/02/2013    Nausea Only Ultram [Tramadol]  10/02/2013    Nausea Only Current Immunizations  Never Reviewed No immunizations on file. Not reviewed this visit You Were Diagnosed With   
  
 Codes Comments Symptomatic menopausal or female climacteric states    -  Primary ICD-10-CM: N95.1 ICD-9-CM: 627.2 History of      ICD-10-CM: D64.828 ICD-9-CM: V45.89 Vitals BP Pulse Temp Height(growth percentile) Weight(growth percentile) LMP  
 123/65 70 97.1 °F (36.2 °C) (Temporal) 5' 1\" (1.549 m) 180 lb 9.6 oz (81.9 kg) (LMP Unknown) SpO2 BMI OB Status Smoking Status 98% 34.12 kg/m2 Postmenopausal Never Smoker Vitals History BMI and BSA Data Body Mass Index Body Surface Area  
 34.12 kg/m 2 1.88 m 2 Preferred Pharmacy Pharmacy Name Phone [id number], [address] [phone] Your Updated Medication List  
  
   
This list is accurate as of 2/28/18 11:37 AM.  Always use your most recent med list.  
  
  
  
  
 CALCIUM PO Take  by mouth. cholecalciferol 5,000 unit capsule Commonly known as:  VITAMIN D3  
TAKE ONE CAPSULE BY MOUTH EVERY DAY  
  
 gabapentin 100 mg capsule Commonly known as:  NEURONTIN Take 1 Cap by mouth three (3) times daily. glucose blood VI test strips strip Commonly known as:  FREESTYLE INSULINX TEST STRIPS Use to test blood sugar three times daily. Dx.e11.9 HYDROcodone-acetaminophen 5-325 mg per tablet Commonly known as:  Deneen Cid Take 1 Tab by mouth every eight (8) hours as needed for Pain. Max Daily Amount: 3 Tabs. insulin glargine 100 unit/mL (3 mL) Inpn Commonly known as:  Manuel Jenkins Inject 6 units every night Insulin Needles (Disposable) 32 gauge x 5/32\" Ndle Commonly known as:  Katelin Pen Needle Use to inject insulin once daily  
  
 pravastatin 40 mg tablet Commonly known as:  PRAVACHOL Take 1 Tab by mouth nightly. PRENATAL FORMULA PO Take  by mouth. simvastatin 20 mg tablet Commonly known as:  ZOCOR Take  by mouth nightly. spironolactone-hydrochlorothiazide 25-25 mg per tablet Commonly known as:  ALDACTAZIDE TAKE 1 TABLET BY MOUTH ONCE A DAY  
  
 VITAMIN A PO Take  by mouth. VITAMIN B-12 PO Take  by mouth. Follow-up Instructions Return in about 1 year (around 2/28/2019), or if symptoms worsen or fail to improve. Introducing [facility]! Dear Odalys Dos Santos: Thank you for requesting a [employer] account. Our records indicate that you already have an active [employer] account.   You can access your account anytime at [email or web address] Did you know that you can access your hospital and ER discharge instructions at any time in [email or web address]? You can also review all of your test results from your hospital stay or ER visit. Additional Information If you have questions, please visit the Frequently Asked Questions section of the [email or web address] website at [email or web address] Remember, [email or web address] is NOT to be used for urgent needs. For medical emergencies, dial 911. Now available from your iPhone and Android! Please provide this summary of care documentation to your next provider. Your primary care clinician is listed as Petra De La Rosa. If you have any questions after today's visit, please call [phone].

## 2018-03-20 ENCOUNTER — OFFICE VISIT (OUTPATIENT)
Dept: INTERNAL MEDICINE CLINIC | Age: 63
End: 2018-03-20

## 2018-03-20 VITALS
BODY MASS INDEX: 34.53 KG/M2 | TEMPERATURE: 98.3 F | RESPIRATION RATE: 16 BRPM | SYSTOLIC BLOOD PRESSURE: 115 MMHG | WEIGHT: 182.9 LBS | HEART RATE: 76 BPM | HEIGHT: 61 IN | DIASTOLIC BLOOD PRESSURE: 78 MMHG | OXYGEN SATURATION: 95 %

## 2018-03-20 DIAGNOSIS — E11.9 CONTROLLED TYPE 2 DIABETES MELLITUS WITHOUT COMPLICATION, WITH LONG-TERM CURRENT USE OF INSULIN (HCC): ICD-10-CM

## 2018-03-20 DIAGNOSIS — R09.89 BRUIT OF RIGHT CAROTID ARTERY: ICD-10-CM

## 2018-03-20 DIAGNOSIS — I10 ESSENTIAL HYPERTENSION: ICD-10-CM

## 2018-03-20 DIAGNOSIS — Z79.4 CONTROLLED TYPE 2 DIABETES MELLITUS WITHOUT COMPLICATION, WITH LONG-TERM CURRENT USE OF INSULIN (HCC): ICD-10-CM

## 2018-03-20 DIAGNOSIS — E78.5 DYSLIPIDEMIA: ICD-10-CM

## 2018-03-20 DIAGNOSIS — M75.02 ADHESIVE CAPSULITIS OF LEFT SHOULDER: Primary | ICD-10-CM

## 2018-03-20 PROBLEM — T85.79XA INFECTED PROSTHETIC MESH OF ABDOMINAL WALL (HCC): Status: RESOLVED | Noted: 2017-06-22 | Resolved: 2018-03-20

## 2018-03-20 PROBLEM — E11.40 TYPE 2 DIABETES MELLITUS WITH DIABETIC NEUROPATHY (HCC): Status: RESOLVED | Noted: 2018-02-28 | Resolved: 2018-03-20

## 2018-03-20 RX ORDER — PANTOPRAZOLE SODIUM 20 MG/1
20 TABLET, DELAYED RELEASE ORAL DAILY
COMMUNITY

## 2018-03-20 RX ORDER — TRIAMCINOLONE ACETONIDE 40 MG/ML
40 INJECTION, SUSPENSION INTRA-ARTICULAR; INTRAMUSCULAR ONCE
Qty: 1 ML | Refills: 0
Start: 2018-03-20 | End: 2018-03-20

## 2018-03-20 RX ORDER — LIDOCAINE HYDROCHLORIDE 10 MG/ML
5 INJECTION, SOLUTION EPIDURAL; INFILTRATION; INTRACAUDAL; PERINEURAL ONCE
Qty: 5 ML | Refills: 0
Start: 2018-03-20 | End: 2018-03-20

## 2018-03-20 NOTE — MR AVS SNAPSHOT
[facility] 
 
 
 [address] [address] 
[phone] Patient: Tr Dodd MRN: [id number] MYL:[birth year] Visit Information Date & Time Provider Department Dept. Phone Encounter #  
 3/20/2018 12:15 PM Gold Dowell [facility] Sports Medicine and Primary Care [id number] Your Appointments 6/21/2018 10:45 AM  
Any with Marycruz Reid MD  
[address] [place] [address] and Primary Care [facility]) Appt Note: 3 month follow up  
 [address] [facility]  
  
   
 [address] [address] Upcoming Health Maintenance Date Due FOBT Q 1 YEAR AGE 50-75 4/30/2016 MICROALBUMIN Q1 9/17/2016 FOOT EXAM Q1 3/7/2017 EYE EXAM RETINAL OR DILATED Q1 3/24/2017 LIPID PANEL Q1 10/14/2017 HEMOGLOBIN A1C Q6M 6/7/2018 BREAST CANCER SCRN MAMMOGRAM 10/19/2018 PAP AKA CERVICAL CYTOLOGY 1/6/2020 DTaP/Tdap/Td series (2 - Td) 7/28/2027 Allergies as of 3/20/2018  Review Complete On: 3/20/2018 By: Kan aMria Severity Noted Reaction Type Reactions Dilaudid [Hydromorphone]  10/02/2013    Nausea Only Ultram [Tramadol]  10/02/2013    Nausea Only Current Immunizations  Never Reviewed No immunizations on file. Not reviewed this visit You Were Diagnosed With   
  
 Codes Comments Adhesive capsulitis of left shoulder    -  Primary ICD-10-CM: M75.02 
ICD-9-CM: 726.0 Controlled type 2 diabetes mellitus without complication, with long-term current use of insulin (HCC)     ICD-10-CM: E11.9, Z79.4 ICD-9-CM: 250.00, V58.67 Dyslipidemia     ICD-10-CM: E78.5 ICD-9-CM: 272.4 Essential hypertension     ICD-10-CM: I10 
ICD-9-CM: 401.9 Bruit of right carotid artery     ICD-10-CM: R09.89 ICD-9-CM: 600. 9 Vitals BP Pulse Temp Resp Height(growth percentile) Weight(growth percentile) 115/78 (BP 1 Location: Right arm, BP Patient Position: Sitting) 76 98.3 °F (36.8 °C) (Oral) 16 5' 1\" (1.549 m) 182 lb 14.4 oz (83 kg) LMP SpO2 BMI OB Status Smoking Status (LMP Unknown) 95% 34.56 kg/m2 Postmenopausal Never Smoker Vitals History BMI and BSA Data Body Mass Index Body Surface Area 34.56 kg/m 2 1.89 m 2 Preferred Pharmacy Pharmacy Name Phone [id number], [address] [facility] [phone] Your Updated Medication List  
  
   
This list is accurate as of 3/20/18  2:40 PM.  Always use your most recent med list.  
  
  
  
  
 CALCIUM PO Take  by mouth. cholecalciferol 5,000 unit capsule Commonly known as:  VITAMIN D3  
TAKE ONE CAPSULE BY MOUTH EVERY DAY  
  
 gabapentin 100 mg capsule Commonly known as:  NEURONTIN Take 1 Cap by mouth three (3) times daily. glucose blood VI test strips strip Commonly known as:  FREESTYLE INSULINX TEST STRIPS Use to test blood sugar three times daily. Dx.e11.9 HYDROcodone-acetaminophen 5-325 mg per tablet Commonly known as:  Raiza  Take 1 Tab by mouth every eight (8) hours as needed for Pain. Max Daily Amount: 3 Tabs. insulin glargine 100 unit/mL (3 mL) Inpn Commonly known as:  Danette Henderson Inject 6 units every night Insulin Needles (Disposable) 32 gauge x 5/32\" Ndle Commonly known as:  Katelin Pen Needle Use to inject insulin once daily  
  
 pantoprazole 20 mg tablet Commonly known as:  PROTONIX Take 20 mg by mouth daily. pravastatin 40 mg tablet Commonly known as:  PRAVACHOL Take 1 Tab by mouth nightly. PRENATAL FORMULA PO Take  by mouth. simvastatin 20 mg tablet Commonly known as:  ZOCOR Take  by mouth nightly. spironolactone-hydrochlorothiazide 25-25 mg per tablet Commonly known as:  ALDACTAZIDE TAKE 1 TABLET BY MOUTH ONCE A DAY  
  
 VITAMIN A PO Take  by mouth.   
  
 VITAMIN B-12 PO  
 Take  by mouth. We Performed the Following CBC WITH AUTOMATED DIFF [77204 CPT(R)] HEMOGLOBIN A1C WITH EAG [08898 CPT(R)] METABOLIC PANEL, BASIC [26450 CPT(R)] Introducing [facility]! Dear Abhishek Blue: Thank you for requesting a [employer] account. Our records indicate that you already have an active [employer] account. You can access your account anytime at [email or web address] Did you know that you can access your hospital and ER discharge instructions at any time in [employer]? You can also review all of your test results from your hospital stay or ER visit. Additional Information If you have questions, please visit the Frequently Asked Questions section of the [employer] website at [email or web address] Remember, [employer] is NOT to be used for urgent needs. For medical emergencies, dial 911. Now available from your iPhone and Android! Please provide this summary of care documentation to your next provider. Your primary care clinician is listed as Petra De La Rosa. If you have any questions after today's visit, please call [phone].

## 2018-03-20 NOTE — PROGRESS NOTES
Chief Complaint   Patient presents with    Diabetes     3 month follow up      1. Have you been to the ER, urgent care clinic since your last visit? Hospitalized since your last visit? Yes When: first week of Marck 2018 Where: [facility] ED Reason for visit: bile obstruction    2. Have you seen or consulted any other health care providers outside of the [address] [place] [address] since your last visit? Include any pap smears or colon screening.  No

## 2018-03-21 LAB
BASOPHILS # BLD AUTO: 0 X10E3/UL (ref 0–0.2)
BASOPHILS NFR BLD AUTO: 0 %
BUN SERPL-MCNC: 13 MG/DL (ref 8–27)
BUN/CREAT SERPL: 19 (ref 12–28)
CALCIUM SERPL-MCNC: 9.6 MG/DL (ref 8.7–10.3)
CHLORIDE SERPL-SCNC: 98 MMOL/L (ref 96–106)
CO2 SERPL-SCNC: 28 MMOL/L (ref 18–29)
CREAT SERPL-MCNC: 0.7 MG/DL (ref 0.57–1)
EOSINOPHIL # BLD AUTO: 0.1 X10E3/UL (ref 0–0.4)
EOSINOPHIL NFR BLD AUTO: 1 %
ERYTHROCYTE [DISTWIDTH] IN BLOOD BY AUTOMATED COUNT: 17.4 % (ref 12.3–15.4)
EST. AVERAGE GLUCOSE BLD GHB EST-MCNC: 120 MG/DL
GFR SERPLBLD CREATININE-BSD FMLA CKD-EPI: 108 ML/MIN/1.73
GFR SERPLBLD CREATININE-BSD FMLA CKD-EPI: 94 ML/MIN/1.73
GLUCOSE SERPL-MCNC: 86 MG/DL (ref 65–99)
HBA1C MFR BLD: 5.8 % (ref 4.8–5.6)
HCT VFR BLD AUTO: 39.3 % (ref 34–46.6)
HGB BLD-MCNC: 12 G/DL (ref 11.1–15.9)
IMM GRANULOCYTES # BLD: 0 X10E3/UL (ref 0–0.1)
IMM GRANULOCYTES NFR BLD: 0 %
LYMPHOCYTES # BLD AUTO: 2.1 X10E3/UL (ref 0.7–3.1)
LYMPHOCYTES NFR BLD AUTO: 36 %
MCH RBC QN AUTO: 27 PG (ref 26.6–33)
MCHC RBC AUTO-ENTMCNC: 30.5 G/DL (ref 31.5–35.7)
MCV RBC AUTO: 88 FL (ref 79–97)
MONOCYTES # BLD AUTO: 0.4 X10E3/UL (ref 0.1–0.9)
MONOCYTES NFR BLD AUTO: 6 %
NEUTROPHILS # BLD AUTO: 3.4 X10E3/UL (ref 1.4–7)
NEUTROPHILS NFR BLD AUTO: 57 %
PLATELET # BLD AUTO: 277 X10E3/UL (ref 150–379)
POTASSIUM SERPL-SCNC: 3.9 MMOL/L (ref 3.5–5.2)
RBC # BLD AUTO: 4.45 X10E6/UL (ref 3.77–5.28)
SODIUM SERPL-SCNC: 143 MMOL/L (ref 134–144)
WBC # BLD AUTO: 5.9 X10E3/UL (ref 3.4–10.8)

## 2018-03-21 NOTE — ASSESSMENT & PLAN NOTE
Stable, based on history, physical exam and review of pertinent labs, studies and medications; meds reconciled; lifestyle modifications recommended, medication compliance emphasized. Key Antihyperglycemic Medications             insulin glargine (LANTUS,BASAGLAR) 100 unit/mL (3 mL) inpn  (Taking) Inject 6 units every night        Other Key Diabetic Medications             gabapentin (NEURONTIN) 100 mg capsule  (Taking) Take 1 Cap by mouth three (3) times daily. pravastatin (PRAVACHOL) 40 mg tablet  (Taking) Take 1 Tab by mouth nightly. simvastatin (ZOCOR) 20 mg tablet Take  by mouth nightly.         Lab Results   Component Value Date/Time    Hemoglobin A1c 5.4 12/07/2017 02:58 PM    Glucose 92 06/22/2017 01:01 PM    Creatinine 0.71 06/22/2017 01:01 PM    Cholesterol, total 101 10/14/2016 01:46 PM    HDL Cholesterol 34 10/14/2016 01:46 PM    LDL, calculated 52 10/14/2016 01:46 PM    Triglyceride 76 10/14/2016 01:46 PM     Diabetic Foot and Eye Exam HM Status   Topic Date Due    Diabetic Foot Care  03/07/2017    Eye Exam  03/24/2017

## 2018-03-21 NOTE — PROGRESS NOTES
[address] [facility]  [place] [address]  [address] [place] [address]  Phone:  [phone]  Fax: [phone]       Chief Complaint   Patient presents with    Diabetes     3 month follow up    . SUBJECTIVE:    Kendell Fairchild is a 66 y.o. female comes in for return visit stating that she has done fairly well. She does however have pain in her right shoulder which is getting progressively worse. She actually has a similar pain in her left shoulder but it is not as bad. Her abdominal pathology is reasonably stable. She did have an episode of pain requiring an ER visit but fortunately nothing developed from this. It appears that she may have had a transient partial small bowel obstruction which resolved spontaneously. Her diabetes has indeed been doing quite well. She remains on her insulin 6 units of Lantus daily along with an oral agent. She has not had any interventional hypoglycemia. She is regaining some of her weight also. She has a past history of primary hypertension and dyslipidemia. She is in a primary cardiovascular risk prevention mode. She does have subclinical disease with carotid plaquing. Current Outpatient Prescriptions   Medication Sig Dispense Refill    pantoprazole (PROTONIX) 20 mg tablet Take 20 mg by mouth daily.  triamcinolone acetonide (KENALOG) 40 mg/mL injection 1 mL by Intra artICUlar route once for 1 dose. 1 mL 0    lidocaine, PF, (XYLOCAINE) 10 mg/mL (1 %) injection 5 mL by Intra artICUlar route once for 1 dose. 5 mL 0    PRENATAL VIT 48/CIPV FUM/FOLIC (PRENATAL FORMULA PO) Take  by mouth.  gabapentin (NEURONTIN) 100 mg capsule Take 1 Cap by mouth three (3) times daily. 90 Cap 11    glucose blood VI test strips (FREESTYLE INSULINX TEST STRIPS) strip Use to test blood sugar three times daily.  Dx.e11.9 100 Strip 11    insulin glargine (LANTUS,BASAGLAR) 100 unit/mL (3 mL) inpn Inject 6 units every night 15 mL 11    spironolactone-hydrochlorothiazide (ALDACTAZIDE) 25-25 mg per tablet TAKE 1 TABLET BY MOUTH ONCE A DAY 30 Tab 11    Insulin Needles, Disposable, (KEYANA PEN NEEDLE) 32 gauge x \" ndle Use to inject insulin once daily 100 Pen Needle 11    pravastatin (PRAVACHOL) 40 mg tablet Take 1 Tab by mouth nightly. 30 Tab 11    cholecalciferol (VITAMIN D3) 5,000 unit capsule TAKE ONE CAPSULE BY MOUTH EVERY DAY  11    VITAMIN A PO Take  by mouth.  CYANOCOBALAMIN, VITAMIN B-12, (VITAMIN B-12 PO) Take  by mouth.  CALCIUM PO Take  by mouth.  simvastatin (ZOCOR) 20 mg tablet Take  by mouth nightly.  HYDROcodone-acetaminophen (NORCO) 5-325 mg per tablet Take 1 Tab by mouth every eight (8) hours as needed for Pain. Max Daily Amount: 3 Tabs.  36 Tab 0     Past Medical History:   Diagnosis Date    Musculoskeletal disorder      Past Surgical History:   Procedure Laterality Date    HX  SECTION      HX CHOLECYSTECTOMY      HX GASTRIC BYPASS      HX HERNIA REPAIR Bilateral     HX SMALL BOWEL RESECTION      HX TUBAL LIGATION       Allergies   Allergen Reactions    Dilaudid [Hydromorphone] Nausea Only    Ultram [Tramadol] Nausea Only         REVIEW OF SYSTEMS:  General: negative for - chills or fever  ENT: negative for - headaches, nasal congestion or tinnitus  Respiratory: negative for - cough, hemoptysis, shortness of breath or wheezing  Cardiovascular : negative for - chest pain, edema, palpitations or shortness of breath  Gastrointestinal: negative for - abdominal pain, blood in stools, heartburn or nausea/vomiting  Genito-Urinary: no dysuria, trouble voiding, or hematuria  Musculoskeletal: negative for - gait disturbance, joint pain, joint stiffness or joint swelling  Neurological: no TIA or stroke symptoms  Hematologic: no bruises, no bleeding, no swollen glands  Integument: no lumps, mole changes, nail changes or rash  Endocrine: no malaise/lethargy or unexpected weight changes      Social History     Social History    Marital status: SINGLE     Spouse name: N/A    Number of children: N/A    Years of education: N/A     Occupational History    [employer]      Social History Main Topics    Smoking status: Never Smoker    Smokeless tobacco: Never Used    Alcohol use No    Drug use: No    Sexual activity: No     Other Topics Concern    None     Social History Narrative     Family History   Problem Relation Age of Onset    Hypertension Mother     Diabetes Mother     Heart Disease Mother      CHF    Heart Disease Father      massive M. I.    Alcohol abuse Brother     Alcohol abuse Brother        OBJECTIVE:    Visit Vitals    /78 (BP 1 Location: Right arm, BP Patient Position: Sitting)    Pulse 76    Temp 98.3 °F (36.8 °C) (Oral)    Resp 16    Ht 5' 1\" (1.549 m)    Wt 182 lb 14.4 oz (83 kg)    LMP  (LMP Unknown)    SpO2 95%    BMI 34.56 kg/m2     CONSTITUTIONAL: well , well nourished, appears age appropriate  EYES: perrla, eom intact  ENMT:moist mucous membranes, pharynx clear  NECK: supple. Thyroid normal  RESPIRATORY: Chest: clear to ascultation and percussion   CARDIOVASCULAR: Heart: regular rate and rhythm  GASTROINTESTINAL: Abdomen: soft, bowel sounds active  HEMATOLOGIC: no pathological lymph nodes palpated  MUSCULOSKELETAL: Extremities: no edema, pulse 1+, pain elicited range of motion left shoulder  INTEGUMENT: No unusual rashes or suspicious skin lesions noted. Nails appear normal.  NEUROLOGIC: non-focal exam   MENTAL STATUS: alert and oriented, appropriate affect      ASSESSMENT:  1. Adhesive capsulitis of left shoulder    2. Controlled type 2 diabetes mellitus without complication, with long-term current use of insulin ([address]    3. Dyslipidemia    4. Essential hypertension    5. Bruit of right carotid artery      Diagnoses and all orders for this visit:    1.  Adhesive capsulitis of left shoulder  -     TRIAMCINOLONE ACETONIDE INJ  - triamcinolone acetonide (KENALOG) 40 mg/mL injection; 1 mL by Intra artICUlar route once for 1 dose. -     lidocaine, PF, (XYLOCAINE) 10 mg/mL (1 %) injection; 5 mL by Intra artICUlar route once for 1 dose. 2. Controlled type 2 diabetes mellitus without complication, with long-term current use of insulin ([facility])  Assessment & Plan:  Stable, based on history, physical exam and review of pertinent labs, studies and medications; meds reconciled; lifestyle modifications recommended, medication compliance emphasized. Key Antihyperglycemic Medications             insulin glargine (LANTUS,BASAGLAR) 100 unit/mL (3 mL) inpn  (Taking) Inject 6 units every night        Other Key Diabetic Medications             gabapentin (NEURONTIN) 100 mg capsule  (Taking) Take 1 Cap by mouth three (3) times daily. pravastatin (PRAVACHOL) 40 mg tablet  (Taking) Take 1 Tab by mouth nightly. simvastatin (ZOCOR) 20 mg tablet Take  by mouth nightly. Lab Results   Component Value Date/Time    Hemoglobin A1c 5.4 12/07/2017 02:58 PM    Glucose 92 06/22/2017 01:01 PM    Creatinine 0.71 06/22/2017 01:01 PM    Cholesterol, total 101 10/14/2016 01:46 PM    HDL Cholesterol 34 10/14/2016 01:46 PM    LDL, calculated 52 10/14/2016 01:46 PM    Triglyceride 76 10/14/2016 01:46 PM     Diabetic Foot and Eye Exam HM Status   Topic Date Due    Diabetic Foot Care  03/07/2017    Eye Exam  03/24/2017       Orders:  -     HEMOGLOBIN A1C WITH EAG    3. Dyslipidemia    4. Essential hypertension  -     CBC WITH AUTOMATED DIFF  -     METABOLIC PANEL, BASIC    5. Bruit of right carotid artery        PLAN:    1. The patient has pericapsulitis of the left shoulder. Procedure:  Using sterile technique, I injected Kenalog 20 mg and xylocaine 1% 5 cc into the posterior aspect of the left shoulder. She tolerated the procedure well. She is not a candidate for NSAIDs in view of her gastric bypass. 2. Her diabetes historically has been doing well.   I will await the results of the hemoglobin A1c.    3. She will continue statin as prescribed. She is in the primary cardiovascular risk prevention mode. 4. Blood pressure is excellent today. 5. She continues with her carotid bruit which represents carotic plaquing. This demonstrates she has subclinical disease in activation of her atherosclerotic process. .  Orders Placed This Encounter    HEMOGLOBIN A1C WITH EAG    CBC WITH AUTOMATED DIFF    METABOLIC PANEL, BASIC    pantoprazole (PROTONIX) 20 mg tablet    triamcinolone acetonide (KENALOG) 40 mg/mL injection    lidocaine, PF, (XYLOCAINE) 10 mg/mL (1 %) injection         Follow-up Disposition:  Return in about 3 months (around 6/20/2018).       Jackelin Funez MD

## 2018-04-10 RX ORDER — GABAPENTIN 100 MG/1
100 CAPSULE ORAL 3 TIMES DAILY
Qty: 270 CAP | Refills: 3 | Status: SHIPPED | OUTPATIENT
Start: 2018-04-10 | End: 2019-06-27 | Stop reason: SDUPTHER

## 2018-05-23 DIAGNOSIS — R10.84 GENERALIZED ABDOMINAL PAIN: Primary | ICD-10-CM

## 2018-07-05 ENCOUNTER — OFFICE VISIT (OUTPATIENT)
Dept: INTERNAL MEDICINE CLINIC | Age: 63
End: 2018-07-05

## 2018-07-05 VITALS
BODY MASS INDEX: 37.21 KG/M2 | HEART RATE: 62 BPM | SYSTOLIC BLOOD PRESSURE: 118 MMHG | HEIGHT: 61 IN | TEMPERATURE: 97.9 F | WEIGHT: 197.1 LBS | OXYGEN SATURATION: 98 % | RESPIRATION RATE: 16 BRPM | DIASTOLIC BLOOD PRESSURE: 79 MMHG

## 2018-07-05 DIAGNOSIS — I10 ESSENTIAL HYPERTENSION: ICD-10-CM

## 2018-07-05 DIAGNOSIS — E78.5 DYSLIPIDEMIA: ICD-10-CM

## 2018-07-05 DIAGNOSIS — Z79.4 CONTROLLED TYPE 2 DIABETES MELLITUS WITHOUT COMPLICATION, WITH LONG-TERM CURRENT USE OF INSULIN (HCC): Primary | ICD-10-CM

## 2018-07-05 DIAGNOSIS — E11.9 CONTROLLED TYPE 2 DIABETES MELLITUS WITHOUT COMPLICATION, WITH LONG-TERM CURRENT USE OF INSULIN (HCC): Primary | ICD-10-CM

## 2018-07-05 DIAGNOSIS — R09.89 BRUIT OF RIGHT CAROTID ARTERY: ICD-10-CM

## 2018-07-05 DIAGNOSIS — E66.09 CLASS 2 OBESITY DUE TO EXCESS CALORIES WITHOUT SERIOUS COMORBIDITY WITH BODY MASS INDEX (BMI) OF 37.0 TO 37.9 IN ADULT: ICD-10-CM

## 2018-07-05 NOTE — MR AVS SNAPSHOT
[address] [facility] 
 
 
 [address] [id number] 
[phone] Patient: Jerome Torres MRN: [id number] JOT:[id number] Visit Information Date & Time Provider Department Dept. Phone Encounter #  
 7/5/2018 10:00 AM Karely Gatica MD [place] [facility] [id number] Your Appointments 10/9/2018 10:30 AM  
Any with Karely Gatica MD  
[address] [place] [address] and Primary Care [facility] Appt Note: 3 month f/u  
 [address] [facility]  
  
   
 [id number] Upcoming Health Maintenance Date Due FOBT Q 1 YEAR AGE 50-75 4/30/2016 MICROALBUMIN Q1 9/17/2016 FOOT EXAM Q1 3/7/2017 LIPID PANEL Q1 10/14/2017 BREAST CANCER SCRN MAMMOGRAM 10/19/2018 EYE EXAM RETINAL OR DILATED Q1 12/5/2018* Influenza Age 5 to Adult 8/1/2018 HEMOGLOBIN A1C Q6M 9/20/2018 PAP AKA CERVICAL CYTOLOGY 1/6/2020 DTaP/Tdap/Td series (2 - Td) 7/28/2027 *Topic was postponed. The date shown is not the original due date. Allergies as of 7/5/2018  Review Complete On: 7/5/2018 By: Colton Mendoza Severity Noted Reaction Type Reactions Dilaudid [Hydromorphone]  10/02/2013    Nausea Only Ultram [Tramadol]  10/02/2013    Nausea Only Current Immunizations  Never Reviewed No immunizations on file. Not reviewed this visit You Were Diagnosed With   
  
 Codes Comments Controlled type 2 diabetes mellitus without complication, with long-term current use of insulin (HCC)    -  Primary ICD-10-CM: E11.9, Z79.4 ICD-9-CM: 250.00, V58.67 Dyslipidemia     ICD-10-CM: E78.5 ICD-9-CM: 272.4 Bruit of right carotid artery     ICD-10-CM: R09.89 ICD-9-CM: 785.9 Essential hypertension     ICD-10-CM: I10 
ICD-9-CM: 401.9  Class 2 obesity due to excess calories without serious comorbidity with body mass index (BMI) of 37.0 to 37.9 in adult     ICD-10-CM: E66.09, E67.48 ICD-9-CM: 278.00, V85.37 Vitals BP Pulse Temp Resp Height(growth percentile) Weight(growth percentile) 118/79 62 97.9 °F (36.6 °C) (Oral) 16 5' 1\" (1.549 m) 197 lb 1.6 oz (89.4 kg) LMP SpO2 BMI OB Status Smoking Status (LMP Unknown) 98% 37.24 kg/m2 Postmenopausal Never Smoker Vitals History BMI and BSA Data Body Mass Index Body Surface Area  
 37.24 kg/m 2 1.96 m 2 Preferred Pharmacy Pharmacy Name Phone [id number], [address] [facility] [place] [phone] Your Updated Medication List  
  
   
This list is accurate as of 7/5/18 12:29 PM.  Always use your most recent med list.  
  
  
  
  
 biotin-keratin 10,000-100 mcg-mg Tab Take 1 Tab by mouth daily. CALCIUM PO Take  by mouth. cholecalciferol 5,000 unit capsule Commonly known as:  VITAMIN D3  
TAKE ONE CAPSULE BY MOUTH EVERY DAY  
  
 gabapentin 100 mg capsule Commonly known as:  NEURONTIN Take 1 Cap by mouth three (3) times daily. glucose blood VI test strips strip Commonly known as:  FREESTYLE INSULINX TEST STRIPS Use to test blood sugar three times daily. Dx.e11.9 HYDROcodone-acetaminophen 5-325 mg per tablet Commonly known as:  Nelma Wesley Take 1 Tab by mouth every eight (8) hours as needed for Pain. Max Daily Amount: 3 Tabs. insulin glargine 100 unit/mL (3 mL) Inpn Commonly known as:  Reesa Rosie Inject 6 units every night Insulin Needles (Disposable) 32 gauge x 5/32\" Ndle Commonly known as:  Katelin Pen Needle Use to inject insulin once daily IRON-VITAMINS PO Take 1 Tab by mouth daily. MELATONIN (BULK) Take 1 Tab by mouth daily. pantoprazole 20 mg tablet Commonly known as:  PROTONIX Take 20 mg by mouth daily. pravastatin 40 mg tablet Commonly known as:  PRAVACHOL Take 1 Tab by mouth nightly. PRENATAL FORMULA PO Take  by mouth. simvastatin 20 mg tablet Commonly known as:  ZOCOR Take  by mouth nightly. spironolactone-hydrochlorothiazide 25-25 mg per tablet Commonly known as:  ALDACTAZIDE TAKE 1 TABLET BY MOUTH ONCE A DAY  
  
 VITAMIN A PO Take  by mouth. VITAMIN B-12 PO Take  by mouth. We Performed the Following APOLIPOPROTEIN B [id number] CPT(R)] HEMOGLOBIN A1C WITH EAG [90005 CPT(R)]  DIABETES FOOT EXAM [HM7 Custom] MICROALBUMIN, UR, RAND W/ MICROALB/CREAT RATIO [id number] CPT(R)] Introducing [facility] SERVICES! Dear Amna Prieto: Thank you for requesting a [employer] account. Our records indicate that you already have an active [employer] account. You can access your account anytime at [email or web address] Did you know that you can access your hospital and ER discharge instructions at any time in [employer]? You can also review all of your test results from your hospital stay or ER visit. Additional Information If you have questions, please visit the Frequently Asked Questions section of the [employer] website at [email or web address] Remember, [employer] is NOT to be used for urgent needs. For medical emergencies, dial 911. Now available from your iPhone and Android! Please provide this summary of care documentation to your next provider. Your primary care clinician is listed as Petra De La Rosa. If you have any questions after today's visit, please call [phone].

## 2018-07-05 NOTE — PROGRESS NOTES
Chief Complaint   Patient presents with    Diabetes     3 month follow up      1. Have you been to the ER, urgent care clinic since your last visit? Hospitalized since your last visit? No    2. Have you seen or consulted any other health care providers outside of the [address] [place] [address] since your last visit? Include any pap smears or colon screening.  No

## 2018-07-06 LAB
ALBUMIN/CREAT UR: <5.8 MG/G CREAT (ref 0–30)
APO B SERPL-MCNC: 72 MG/DL (ref 54–133)
CREAT UR-MCNC: 51.3 MG/DL
EST. AVERAGE GLUCOSE BLD GHB EST-MCNC: 117 MG/DL
HBA1C MFR BLD: 5.7 % (ref 4.8–5.6)
MICROALBUMIN UR-MCNC: <3 UG/ML

## 2018-07-07 NOTE — PROGRESS NOTES
[facility] 
[place] [address] 
[address] [place] [address] [address] Phone:  [phone]  Fax: [phone] Chief Complaint Patient presents with  Diabetes 3 month follow up Reyes Barker SUBJECTIVE: 
  Lorena Reyes is a 66 y.o. female comes in for return visit stating in general she has been doing well. Her diabetes is excellent. She has not had any interventional hypoglycemia. She remains on her insulin 6 units of Lantus daily. She has subclinical disease manifest by carotid plaquing which is nonocclusive, of course. Unfortunately, she is beginning to gain weight. We talked about this at length. She admits to eating processed carbohydrates. She has a past history of primary hypertension and dyslipidemia. She has had no further abdominal pain. Current Outpatient Prescriptions Medication Sig Dispense Refill  vit B-comp w-Fe,Ca,FA<1mg (IRON-VITAMINS PO) Take 1 Tab by mouth daily.  biotin-keratin 10,000-100 mcg-mg tab Take 1 Tab by mouth daily.  MELATONIN, BULK, Take 1 Tab by mouth daily.  gabapentin (NEURONTIN) 100 mg capsule Take 1 Cap by mouth three (3) times daily. [address]  pantoprazole (PROTONIX) 20 mg tablet Take 20 mg by mouth daily.  PRENATAL VIT 36/QYRZ FUM/FOLIC (PRENATAL FORMULA PO) Take  by mouth.  glucose blood VI test strips (FREESTYLE INSULINX TEST STRIPS) strip Use to test blood sugar three times daily. Dx.e11.9 100 Strip 11  
 insulin glargine (LANTUS,BASAGLAR) 100 unit/mL (3 mL) inpn Inject 6 units every night 15 mL 11  
 spironolactone-hydrochlorothiazide (ALDACTAZIDE) 25-25 mg per tablet TAKE 1 TABLET BY MOUTH ONCE A DAY 30 Tab 11  Insulin Needles, Disposable, (KEYANA PEN NEEDLE) 32 gauge x 5/32\" ndle Use to inject insulin once daily 100 Pen Needle 11  
 pravastatin (PRAVACHOL) 40 mg tablet Take 1 Tab by mouth nightly.  30 Tab 11  
 cholecalciferol (VITAMIN D3) 5,000 unit capsule TAKE ONE CAPSULE BY MOUTH EVERY DAY  11  
 VITAMIN A PO Take  by mouth.  CYANOCOBALAMIN, VITAMIN B-12, (VITAMIN B-12 PO) Take  by mouth.  CALCIUM PO Take  by mouth.  simvastatin (ZOCOR) 20 mg tablet Take  by mouth nightly.  HYDROcodone-acetaminophen (NORCO) 5-325 mg per tablet Take 1 Tab by mouth every eight (8) hours as needed for Pain. Max Daily Amount: 3 Tabs. 40 Tab 0 Past Medical History:  
Diagnosis Date  Musculoskeletal disorder Past Surgical History:  
Procedure Laterality Date [address]  HX COLONOSCOPY  2018  
 [facility]  HX HERNIA REPAIR Bilateral   
 HX SMALL BOWEL RESECTION    
 HX TUBAL LIGATION  1984 Allergies Allergen Reactions  Dilaudid [Hydromorphone] Nausea Only  Ultram [Tramadol] Nausea Only REVIEW OF SYSTEMS: 
General: negative for - chills or fever ENT: negative for - headaches, nasal congestion or tinnitus Respiratory: negative for - cough, hemoptysis, shortness of breath or wheezing Cardiovascular : negative for - chest pain, edema, palpitations or shortness of breath Gastrointestinal: negative for - abdominal pain, blood in stools, heartburn or nausea/vomiting Genito-Urinary: no dysuria, trouble voiding, or hematuria Musculoskeletal: negative for - gait disturbance, joint pain, joint stiffness or joint swelling Neurological: no TIA or stroke symptoms Hematologic: no bruises, no bleeding, no swollen glands Integument: no lumps, mole changes, nail changes or rash Endocrine: no malaise/lethargy or unexpected weight changes Social History Social History  Marital status: SINGLE Spouse name: N/A  
 Number of children: N/A  
 Years of education: N/A Occupational History  [employer] Social History Main Topics  Smoking status: Never Smoker  Smokeless tobacco: Never Used  Alcohol use No  
 Drug use: No  
 Sexual activity: No  
 
Other Topics Concern  None Social History Narrative Family History Problem Relation Age of Onset  Hypertension Mother  Diabetes Mother  Heart Disease Mother CHF  Heart Disease Father   
  massive M. I.  
 Alcohol abuse Brother  Alcohol abuse Brother OBJECTIVE: 
 
Visit Vitals  /79  Pulse 62  Temp 97.9 °F (36.6 °C) (Oral)  Resp 16  
 Ht 5' 1\" (1.549 m)  Wt 197 lb 1.6 oz (89.4 kg)  LMP  (LMP Unknown)  SpO2 98%  BMI 37.24 kg/m2 CONSTITUTIONAL: well , well nourished, appears age appropriate EYES: perrla, eom intact ENMT:moist mucous membranes, pharynx clear NECK: supple. Thyroid normal, right carotid bruit RESPIRATORY: Chest: clear to ascultation and percussion CARDIOVASCULAR: Heart: regular rate and rhythm GASTROINTESTINAL: Abdomen: soft, bowel sounds active HEMATOLOGIC: no pathological lymph nodes palpated MUSCULOSKELETAL: Extremities: no edema, pulse 1+ INTEGUMENT: No unusual rashes or suspicious skin lesions noted. Nails appear normal. 
NEUROLOGIC: non-focal exam  
MENTAL STATUS: alert and oriented, appropriate affect ASSESSMENT: 
1. Controlled type 2 diabetes mellitus without complication, with long-term current use of insulin ([address] 2. Dyslipidemia 3. Bruit of right carotid artery 4. Essential hypertension 5. Class 2 obesity due to excess calories without serious comorbidity with body mass index (BMI) of 37.0 to 37.9 in adult PLAN: 
 
1. Hopefully the patient's diabetes is well controlled. Hemoglobin A1c will be checked today. 2. She will continue statin as prescribed and efficacy and compliance will be assessed. She is indeed an increased cardiovascular risk in view of her subclinical disease. 3. Blood pressure is excellent today, no adjustments are made. 4. I encouraged the patient to to lose weight by minimizing the consumption of processed carbohydrates.   I also emphasized the importance of eating meals and eliminating snacks. 5. She needs to increase her physical activity also. . 
Orders Placed This Encounter  MICROALBUMIN, UR, RAND  HEMOGLOBIN A1C WITH EAG  
 APOLIPOPROTEIN B  
 vit B-comp w-Fe,Ca,FA<1mg (IRON-VITAMINS PO)  biotin-keratin 10,000-100 mcg-mg tab  MELATONIN, BULK, Follow-up Disposition: 
Return in about 3 months (around 10/5/2018).  
 
 
Shira Hutchison MD

## 2018-07-11 ENCOUNTER — TELEPHONE (OUTPATIENT)
Dept: SURGERY | Age: 63
End: 2018-07-11

## 2018-07-12 NOTE — TELEPHONE ENCOUNTER
Called pt and she will see me on 7/19/2018. She had a colostomy at [facility] due to complication with mesh surgery at [facility]. Then went back for a take down of colostomy and reanastomosis. Now is having small amount of stool in her vagina when she has soft BM\"S.

## 2018-07-17 RX ORDER — INSULIN PUMP SYRINGE, 3 ML
EACH MISCELLANEOUS
Qty: 1 KIT | Refills: 0 | Status: SHIPPED | OUTPATIENT
Start: 2018-07-17

## 2018-07-19 ENCOUNTER — OFFICE VISIT (OUTPATIENT)
Dept: SURGERY | Age: 63
End: 2018-07-19

## 2018-07-19 VITALS
HEART RATE: 75 BPM | HEIGHT: 61 IN | SYSTOLIC BLOOD PRESSURE: 121 MMHG | OXYGEN SATURATION: 98 % | TEMPERATURE: 96.8 F | BODY MASS INDEX: 38.02 KG/M2 | WEIGHT: 201.4 LBS | DIASTOLIC BLOOD PRESSURE: 65 MMHG

## 2018-07-19 DIAGNOSIS — Z98.891 HISTORY OF C-SECTION: ICD-10-CM

## 2018-07-19 DIAGNOSIS — N82.3 RECTOVAGINAL FISTULA: Primary | ICD-10-CM

## 2018-07-19 DIAGNOSIS — N95.1 SYMPTOMATIC MENOPAUSAL OR FEMALE CLIMACTERIC STATES: ICD-10-CM

## 2018-07-19 PROBLEM — E66.01 SEVERE OBESITY (BMI 35.0-39.9): Status: ACTIVE | Noted: 2018-07-19

## 2018-07-19 NOTE — PROGRESS NOTES
SUBJECTIVE: Solange Grigsby is a 66 y.o. female who presents with hx of seeing very small amount of stool coming from vagina when she has BM. No pain. Hx of colostomy at [facility] 2017 and take down of colostomy in 2017. This was done because of complications from abdominal mesh that was placed for abdominal hernia in . No LMP recorded (lmp unknown). Patient is postmenopausal.     History of endometriosis now in remission. History of gastric bypass surgery. History of intestinal adhesions and intestinal obstruction on more than one occasion. Menstrual period 2009 and also bled in mid-August as well. Pelvic ultrasound 2009 revealed uterus to be within normal limits of size, measuring 5.2 x 6.3 x 9.5 cm with multiple small fibroids, the largest measuring 2.5 x 3.2 x 3.0 cm and fundal. The endometrial stripe was 1.1 cm. The right ovary was not visualized. The left ovary measured 1.3 x 2.1 x 2.4 cm. No complaints today. Allergies   Allergen Reactions    Dilaudid [Hydromorphone] Nausea Only    Ultram [Tramadol] Nausea Only      Past Medical History:   Diagnosis Date    Musculoskeletal disorder      Past Surgical History:   Procedure Laterality Date    HX  SECTION      HX CHOLECYSTECTOMY      HX COLONOSCOPY      normal---[facility]    HX GASTRIC BYPASS      HX HERNIA REPAIR Bilateral     HX SMALL BOWEL RESECTION      HX TUBAL LIGATION       OB History      Para Term  AB Living    2 1   1     SAB TAB Ectopic Molar Multiple Live Births                 Family History   Problem Relation Age of Onset    Hypertension Mother     Diabetes Mother     Heart Disease Mother      CHF    Heart Disease Father      massive M. I.    Alcohol abuse Brother     Alcohol abuse Brother      Social History     Social History    Marital status: SINGLE     Spouse name: N/A    Number of children: N/A    Years of education: N/A     Occupational History    [facility] Social History Main Topics    Smoking status: Never Smoker    Smokeless tobacco: Never Used    Alcohol use No    Drug use: No    Sexual activity: No     Other Topics Concern    Not on file     Social History Narrative     Current Outpatient Prescriptions   Medication Sig Dispense Refill    Blood-Glucose Meter (ONETOUCH ULTRA2) monitoring kit Use to test blood sugar twice a day. 1 Kit 0    glucose blood VI test strips (ONETOUCH ULTRA TEST) strip Use to test blood sugar twice a day 100 Strip 11    vit B-comp w-Fe,Ca,FA<1mg (IRON-VITAMINS PO) Take 1 Tab by mouth daily.  biotin-keratin 10,000-100 mcg-mg tab Take 1 Tab by mouth daily.  MELATONIN, BULK, Take 1 Tab by mouth daily.  gabapentin (NEURONTIN) 100 mg capsule Take 1 Cap by mouth three (3) times daily. 270 Cap 3    pantoprazole (PROTONIX) 20 mg tablet Take 20 mg by mouth daily.  PRENATAL VIT 89/WJMW FUM/FOLIC (PRENATAL FORMULA PO) Take  by mouth.  glucose blood VI test strips (FREESTYLE INSULINX TEST STRIPS) strip Use to test blood sugar three times daily. Dx.e11.9 100 Strip 11    insulin glargine (LANTUS,BASAGLAR) 100 unit/mL (3 mL) inpn Inject 6 units every night 15 mL 11    spironolactone-hydrochlorothiazide (ALDACTAZIDE) 25-25 mg per tablet TAKE 1 TABLET BY MOUTH ONCE A DAY 30 Tab 11    Insulin Needles, Disposable, (KEYANA PEN NEEDLE) 32 gauge x 5/32\" ndle Use to inject insulin once daily 100 Pen Needle 11    simvastatin (ZOCOR) 20 mg tablet Take  by mouth nightly.  pravastatin (PRAVACHOL) 40 mg tablet Take 1 Tab by mouth nightly. 30 Tab 11    cholecalciferol (VITAMIN D3) 5,000 unit capsule TAKE ONE CAPSULE BY MOUTH EVERY DAY  11    VITAMIN A PO Take  by mouth.  CYANOCOBALAMIN, VITAMIN B-12, (VITAMIN B-12 PO) Take  by mouth.  CALCIUM PO Take  by mouth.  HYDROcodone-acetaminophen (NORCO) 5-325 mg per tablet Take 1 Tab by mouth every eight (8) hours as needed for Pain. Max Daily Amount: 3 Tabs.  36 Tab 0       Review of Systems:   Constitutional: No weight change, chills or fever, anorexia, weakness or sleep disturbance . Cardiovascular: No chest pain, shortness of breath, or palpitations . Respiratory: No cough, shortness of breath, hemoptysis, or orthopnea . Neurologic: No syncope, headaches or seizures . Hematologic: No easy bruising or unusual bleeding . Psychiatric: No insomnia, confusion, depression, or anxiety . GI:No nausea and vomiting, diarrhea or constipation  . : See HPI . Musculoskeletal: No joint pain or muscle pain . Endocrine: No polydipsia, polyuria, cold intolerance, excessive fatigue, or sleep disturbance . Integumentary: No breast pain, lumps, nipple discharge, or axillary lumps . Objective:     Visit Vitals    /65    Pulse 75    Temp 96.8 °F (36 °C) (Temporal)    Ht 5' 1\" (1.549 m)    Wt 201 lb 6.4 oz (91.4 kg)    LMP  (LMP Unknown)    SpO2 98%    BMI 38.05 kg/m2       General:  alert, cooperative, no distress, appears stated age   Skin:  no rash or abnormalities   Eyes: negative   Mouth: MMM no lesions   Lymph Nodes:  Cervical, supraclavicular, and axillary nodes normal.   Breast Exam: normal appearance, no masses or tenderness    Lungs:  clear to auscultation bilaterally   Heart:  regular rate and rhythm   Abdomen: soft, non-tender. Bowel sounds normal. No masses,  no organomegaly   Back:  Costovertebral angle tenderness absent   Genitourinary: Pelvic exam: VULVA: normal appearing vulva with no masses, tenderness or lesions, VAGINA: normal appearing vagina with normal color and discharge, no lesions, no visible evidence of rectovaginal fistula, which means it is probable very small, CERVIX: normal appearing cervix without discharge or lesions, UTERUS: uterus is normal size, shape, consistency and nontender, ADNEXA: normal adnexa in size, nontender and no masses.  Examination restricted by obesity    Extremities:  extremities normal, atraumatic, no cyanosis or edema Neurologic:  sensation grossly intact. Psychiatric:  non focal     ASSESSMENT:      ICD-10-CM ICD-9-CM    1. Rectovaginal fistula N82.3 619.1    2. Symptomatic menopausal or female climacteric states N95.1 627.2    3. History of  Z98.891 V45.89      Pt was told that the rectovaginal fistula cannot be seen grossly and it is probably there but very small and should close on its own over the next several months. Was told to go back to her surgeon for him to do a colographic enema to see if that shows more. Follow-up Disposition:  Return if symptoms worsen or fail to improve.

## 2018-07-19 NOTE — MR AVS SNAPSHOT
[address] [place] [address]. [facility] [address] [id number] [phone] Patient: Lidia Ball MRN: [id number] XXK: Visit Information Date & Time Provider Department Dept. Phone Encounter #  
 2018  3:15 PM Dorcas Sorto, [address] [facility] Surgical [address] [id number] Follow-up Instructions Return if symptoms worsen or fail to improve. Routing History Follow-up and Disposition History Your Appointments 10/9/2018 10:30 AM  
Any with Paul Zamorano MD  
[address] [place] [address] and [facility] Appt Note: 3 month f/u  
 [address] 1 [facility]  
  
   
 [address]  Upcoming Health Maintenance Date Due FOBT Q 1 YEAR AGE 50-75 2016 LIPID PANEL Q1 10/14/2017 BREAST CANCER SCRN MAMMOGRAM 10/19/2018 EYE EXAM RETINAL OR DILATED Q1 2018* Influenza Age 5 to Adult 2018 HEMOGLOBIN A1C Q6M 2019 FOOT EXAM Q1 2019 MICROALBUMIN Q1 2019 PAP AKA CERVICAL CYTOLOGY 2020 DTaP/Tdap/Td series (2 - Td) 2027 *Topic was postponed. The date shown is not the original due date. Allergies as of 2018  Review Complete On: 2018 By: Dorcas Sorto MD  
  
 Severity Noted Reaction Type Reactions Dilaudid [Hydromorphone]  10/02/2013    Nausea Only Ultram [Tramadol]  10/02/2013    Nausea Only Current Immunizations  Never Reviewed No immunizations on file. Not reviewed this visit You Were Diagnosed With   
  
 Codes Comments Rectovaginal fistula    -  Primary ICD-10-CM: N82.3 ICD-9-CM: 619.1 Symptomatic menopausal or female climacteric states     ICD-10-CM: N95.1 ICD-9-CM: 627.2 History of      ICD-10-CM: F05.784 ICD-9-CM: V45.89 Vitals  BP Pulse Temp Height(growth percentile) Weight(growth percentile) LMP  
 121/65 75 96.8 °F (36 °C) (Temporal) 5' 1\" (1.549 m) 201 lb 6.4 oz (91.4 kg) (LMP Unknown) SpO2 BMI OB Status Smoking Status 98% 38.05 kg/m2 Postmenopausal Never Smoker Vitals History BMI and BSA Data Body Mass Index Body Surface Area 38.05 kg/m 2 1.98 m 2 Preferred Pharmacy Pharmacy Name Phone [facility]/PHARMACY #[id number], [address] [phone] Your Updated Medication List  
  
   
This list is accurate as of 7/19/18  4:14 PM.  Always use your most recent med list.  
  
  
  
  
 biotin-keratin 10,000-100 mcg-mg Tab Take 1 Tab by mouth daily. Blood-Glucose Meter monitoring kit Commonly known as:  Tedra Labella Use to test blood sugar twice a day. CALCIUM PO Take  by mouth. cholecalciferol 5,000 unit capsule Commonly known as:  VITAMIN D3  
TAKE ONE CAPSULE BY MOUTH EVERY DAY  
  
 gabapentin 100 mg capsule Commonly known as:  NEURONTIN Take 1 Cap by mouth three (3) times daily. * glucose blood VI test strips strip Commonly known as:  FREESTYLE INSULINX TEST STRIPS Use to test blood sugar three times daily. Dx.e11.9  
  
 * glucose blood VI test strips strip Commonly known as:  ONETOUCH ULTRA TEST Use to test blood sugar twice a day HYDROcodone-acetaminophen 5-325 mg per tablet Commonly known as:  Michelle Cunha Take 1 Tab by mouth every eight (8) hours as needed for Pain. Max Daily Amount: 3 Tabs. insulin glargine 100 unit/mL (3 mL) Inpn Commonly known as:  Zachariah Popper Inject 6 units every night Insulin Needles (Disposable) 32 gauge x 5/32\" Ndle Commonly known as:  Katelin Pen Needle Use to inject insulin once daily IRON-VITAMINS PO Take 1 Tab by mouth daily. MELATONIN (BULK) Take 1 Tab by mouth daily. pantoprazole 20 mg tablet Commonly known as:  PROTONIX Take 20 mg by mouth daily. pravastatin 40 mg tablet Commonly known as:  PRAVACHOL  
 Take 1 Tab by mouth nightly. PRENATAL FORMULA PO Take  by mouth. simvastatin 20 mg tablet Commonly known as:  ZOCOR Take  by mouth nightly. spironolactone-hydrochlorothiazide 25-25 mg per tablet Commonly known as:  ALDACTAZIDE TAKE 1 TABLET BY MOUTH ONCE A DAY  
  
 VITAMIN A PO Take  by mouth. VITAMIN B-12 PO Take  by mouth. * Notice: This list has 2 medication(s) that are the same as other medications prescribed for you. Read the directions carefully, and ask your doctor or other care provider to review them with you. Follow-up Instructions Return if symptoms worsen or fail to improve. Introducing [facility] & HEALTH SERVICES! Dear Amrit Like: Thank you for requesting a [employer] account. Our records indicate that you already have an active [employer] account. You can access your account anytime at [email or web address] Did you know that you can access your hospital and ER discharge instructions at any time in [employer]? You can also review all of your test results from your hospital stay or ER visit. Additional Information If you have questions, please visit the Frequently Asked Questions section of the [employer] website at [email or web address] Remember, [employer] is NOT to be used for urgent needs. For medical emergencies, dial 911. Now available from your iPhone and Android! Please provide this summary of care documentation to your next provider. Your primary care clinician is listed as Petra De La Rosa. If you have any questions after today's visit, please call [phone].

## 2018-09-15 RX ORDER — SPIRONOLACTONE AND HYDROCHLOROTHIAZIDE 25; 25 MG/1; MG/1
TABLET ORAL
Qty: 90 TAB | Refills: 3 | Status: SHIPPED | OUTPATIENT
Start: 2018-09-15 | End: 2019-09-19 | Stop reason: SDUPTHER

## 2018-09-15 RX ORDER — LANCETS
EACH MISCELLANEOUS
Qty: 100 EACH | Refills: 11 | Status: SHIPPED | OUTPATIENT
Start: 2018-09-15

## 2018-10-02 RX ORDER — PRAVASTATIN SODIUM 40 MG/1
TABLET ORAL
Qty: 30 TAB | Refills: 11 | Status: SHIPPED | OUTPATIENT
Start: 2018-10-02 | End: 2019-08-27 | Stop reason: SDUPTHER

## 2019-06-27 RX ORDER — GABAPENTIN 100 MG/1
CAPSULE ORAL
Qty: 270 CAP | Refills: 3 | Status: SHIPPED | OUTPATIENT
Start: 2019-06-27

## 2019-08-27 RX ORDER — PRAVASTATIN SODIUM 40 MG/1
TABLET ORAL
Qty: 90 TAB | Refills: 3 | Status: SHIPPED | OUTPATIENT
Start: 2019-08-27

## 2019-09-19 RX ORDER — SPIRONOLACTONE AND HYDROCHLOROTHIAZIDE 25; 25 MG/1; MG/1
TABLET ORAL
Qty: 90 TAB | Refills: 3 | Status: SHIPPED | OUTPATIENT
Start: 2019-09-19 | End: 2020-09-04

## 2020-09-04 RX ORDER — SPIRONOLACTONE AND HYDROCHLOROTHIAZIDE 25; 25 MG/1; MG/1
TABLET ORAL
Qty: 90 TAB | Refills: 3 | Status: SHIPPED | OUTPATIENT
Start: 2020-09-04 | End: 2021-08-25

## 2021-08-25 RX ORDER — SPIRONOLACTONE AND HYDROCHLOROTHIAZIDE 25; 25 MG/1; MG/1
TABLET ORAL
Qty: 90 TABLET | Refills: 3 | Status: SHIPPED | OUTPATIENT
Start: 2021-08-25

## 2022-03-19 PROBLEM — R53.81 PHYSICAL DECONDITIONING: Status: ACTIVE | Noted: 2017-12-10

## 2023-08-17 NOTE — MR AVS SNAPSHOT
Visit Information Date & Time Provider Department Dept. Phone Encounter #  
 7/28/2017  9:30 AM Kevin Savage MD [facility] and [place] [address] [id number] Follow-up Instructions Return in about 6 weeks (around 9/8/2017). Upcoming Health Maintenance Date Due MICROALBUMIN Q1 9/17/2016 FOOT EXAM Q1 3/7/2017 EYE EXAM RETINAL OR DILATED Q1 11/28/2017* FOBT Q 1 YEAR AGE 50-75 11/28/2017* INFLUENZA AGE 9 TO ADULT 8/1/2017 LIPID PANEL Q1 10/14/2017 HEMOGLOBIN A1C Q6M 12/22/2017 BREAST CANCER SCRN MAMMOGRAM 10/19/2018 PAP AKA CERVICAL CYTOLOGY 1/6/2020 DTaP/Tdap/Td series (2 - Td) 7/28/2027 *Topic was postponed. The date shown is not the original due date. Allergies as of 7/28/2017  Review Complete On: 7/28/2017 By: Ever Gonzales Severity Noted Reaction Type Reactions Dilaudid [Hydromorphone]  10/02/2013    Nausea Only Ultram [Tramadol]  10/02/2013    Nausea Only Current Immunizations  Never Reviewed No immunizations on file. Not reviewed this visit You Were Diagnosed With   
  
 Codes Comments Controlled type 2 diabetes mellitus without complication, without long-term current use of insulin ([facility]    -  Primary ICD-10-CM: E11.9 ICD-9-CM: 250.00 Dyslipidemia     ICD-10-CM: E78.5 ICD-9-CM: 272.4 Bruit of right carotid artery     ICD-10-CM: R09.89 ICD-9-CM: 785.9 Non morbid obesity due to excess calories     ICD-10-CM: E66.09 
ICD-9-CM: 278.00 Vitals BP Pulse Temp Resp Height(growth percentile) Weight(growth percentile) 118/77 (BP 1 Location: Left arm, BP Patient Position: Sitting) 65 97.9 °F (36.6 °C) (Oral) 16 5' 1\" (1.549 m) 171 lb 12.8 oz (77.9 kg) LMP SpO2 BMI OB Status Smoking Status (LMP Unknown) 98% 32.46 kg/m2 Postmenopausal Never Smoker BMI and BSA Data Body Mass Index Body Surface Area  
 32.46 kg/m 2 1.83 m 2 Preferred Pharmacy Pharmacy Name Phone [id number] [address] [place] [phone] Your Updated Medication List  
  
   
This list is accurate as of: 7/28/17 11:25 AM.  Always use your most recent med list.  
  
  
  
  
 CALCIUM PO Take  by mouth. cholecalciferol 5,000 unit capsule Commonly known as:  VITAMIN D3  
TAKE ONE CAPSULE BY MOUTH EVERY DAY  
  
 gabapentin 100 mg capsule Commonly known as:  NEURONTIN Take 100 mg by mouth three (3) times daily. glucose blood VI test strips strip Commonly known as:  FREESTYLE INSULINX TEST STRIPS Use to test blood sugar twice daily. HYDROcodone-acetaminophen 5-325 mg per tablet Commonly known as:  Connor Rider Take 1 Tab by mouth every eight (8) hours as needed for Pain. Max Daily Amount: 3 Tabs. Insulin Needles (Disposable) 32 gauge x 5/32\" Ndle Commonly known as:  Katelin Pen Needle Use to inject insulin once daily  
  
 metFORMIN  mg tablet Commonly known as:  GLUCOPHAGE XR Take 1 Tab by mouth three (3) times daily (with meals). oxyCODONE IR 5 mg immediate release tablet Commonly known as:  Verlan Nomi Take 1 Tab by mouth two (2) times daily as needed for Pain. Max Daily Amount: 10 mg.  
  
 pravastatin 40 mg tablet Commonly known as:  PRAVACHOL Take 1 Tab by mouth nightly. sAXagliptin 5 mg Tab tablet Commonly known as:  ONGLYZA TAKE 1 TABLET BY MOUTH EVERY DAY  
  
 simvastatin 20 mg tablet Commonly known as:  ZOCOR Take  by mouth nightly. spironolactone-hydrochlorothiazide 25-25 mg per tablet Commonly known as:  ALDACTAZIDE TAKE 1 TABLET BY MOUTH ONCE A DAY  
  
 VITAMIN A PO Take  by mouth. VITAMIN B-12 PO Take  by mouth. We Performed the Following AMB POC GLUCOSE BLOOD, BY GLUCOSE MONITORING DEVICE [08530 CPT(R)] Follow-up Instructions Return in about 6 weeks (around 9/8/2017). Introducing [facility] & HEALTH SERVICES! Dear Marline Link: Thank you for requesting a [employer] account. Our records indicate that you already have an active [employer] account. You can access your account anytime at [email or web address] Did you know that you can access your hospital and ER discharge instructions at any time in [employer]? You can also review all of your test results from your hospital stay or ER visit. Additional Information If you have questions, please visit the Frequently Asked Questions section of the [employer] website at [email or web address] Remember, [employer] is NOT to be used for urgent needs. For medical emergencies, dial 911. Now available from your iPhone and Android! Please provide this summary of care documentation to your next provider. Your primary care clinician is listed as Petra De La Rosa. If you have any questions after today's visit, please call [phone]. Derrek Kelly  Orthopaedic Surgery  [address]], [address]  [place] [address]  Phone: [phone]  Fax: [phone]  Scheduled Appointment: 08/29/2023 09:30 AM

## 2024-10-05 ENCOUNTER — HOSPITAL ENCOUNTER (EMERGENCY)
Facility: HOSPITAL | Age: 69
Discharge: HOME | End: 2024-10-06
Attending: STUDENT IN AN ORGANIZED HEALTH CARE EDUCATION/TRAINING PROGRAM
Payer: MEDICARE

## 2024-10-05 DIAGNOSIS — F43.0 STRESS RESPONSE: Primary | ICD-10-CM

## 2024-10-05 LAB
ALBUMIN SERPL-MCNC: 3.8 G/DL (ref 3.5–5.2)
ALBUMIN/GLOB SERPL: 1.4 (ref 1.1–2.2)
ALP SERPL-CCNC: 113 U/L (ref 35–104)
ALT SERPL-CCNC: 63 U/L (ref 10–35)
ANION GAP SERPL CALC-SCNC: 10 MMOL/L (ref 2–12)
APPEARANCE UR: CLEAR
AST SERPL-CCNC: 55 U/L (ref 10–35)
BACTERIA URNS QL MICRO: NEGATIVE /HPF
BASOPHILS # BLD: 0 K/UL (ref 0–1)
BASOPHILS NFR BLD: 0 % (ref 0–1)
BILIRUB SERPL-MCNC: 0.6 MG/DL (ref 0.2–1)
BILIRUB UR QL: NEGATIVE
BUN SERPL-MCNC: 12 MG/DL (ref 8–23)
BUN/CREAT SERPL: 18 (ref 12–20)
CALCIUM SERPL-MCNC: 8.9 MG/DL (ref 8.8–10.2)
CHLORIDE SERPL-SCNC: 103 MMOL/L (ref 98–107)
CO2 SERPL-SCNC: 28 MMOL/L (ref 22–29)
COLOR UR: NORMAL
CREAT SERPL-MCNC: 0.65 MG/DL (ref 0.5–0.9)
DIFFERENTIAL METHOD BLD: ABNORMAL
EOSINOPHIL # BLD: 0.1 K/UL (ref 0–0.4)
EOSINOPHIL NFR BLD: 2 %
EPITH CASTS URNS QL MICRO: NORMAL /LPF
ERYTHROCYTE [DISTWIDTH] IN BLOOD BY AUTOMATED COUNT: 15.7 % (ref 11.5–14.5)
GLOBULIN SER CALC-MCNC: 2.7 G/DL (ref 2–4)
GLUCOSE SERPL-MCNC: 141 MG/DL (ref 65–100)
GLUCOSE UR STRIP.AUTO-MCNC: NEGATIVE MG/DL
HCT VFR BLD AUTO: 36.1 % (ref 35–47)
HGB BLD-MCNC: 11.5 G/DL (ref 11.5–16)
HGB UR QL STRIP: NEGATIVE
IMM GRANULOCYTES # BLD AUTO: 0 K/UL (ref 0–0.04)
IMM GRANULOCYTES NFR BLD AUTO: 0 % (ref 0–0.5)
KETONES UR QL STRIP.AUTO: NEGATIVE MG/DL
LEUKOCYTE ESTERASE UR QL STRIP.AUTO: NEGATIVE
LYMPHOCYTES # BLD: 2.3 K/UL (ref 0.8–3.5)
LYMPHOCYTES NFR BLD: 37 % (ref 12–49)
MCH RBC QN AUTO: 28 PG (ref 26–34)
MCHC RBC AUTO-ENTMCNC: 31.9 G/DL (ref 30–36.5)
MCV RBC AUTO: 87.8 FL (ref 80–99)
MONOCYTES # BLD: 0.5 K/UL (ref 0–1)
MONOCYTES NFR BLD: 9 % (ref 5–13)
NEUTS SEG # BLD: 3.1 K/UL (ref 1.8–8)
NEUTS SEG NFR BLD: 52 % (ref 32–75)
NITRITE UR QL STRIP.AUTO: NEGATIVE
NRBC # BLD: 0 K/UL (ref 0–0.01)
NRBC BLD-RTO: 0 PER 100 WBC
PH UR STRIP: 6 (ref 5–8)
PLATELET # BLD AUTO: 245 K/UL (ref 150–400)
PMV BLD AUTO: 9.5 FL (ref 8.9–12.9)
POTASSIUM SERPL-SCNC: 4.1 MMOL/L (ref 3.5–5.1)
PROT SERPL-MCNC: 6.5 G/DL (ref 6.4–8.3)
PROT UR STRIP-MCNC: NEGATIVE MG/DL
RBC # BLD AUTO: 4.11 M/UL (ref 3.8–5.2)
RBC #/AREA URNS HPF: NORMAL /HPF
SODIUM SERPL-SCNC: 141 MMOL/L (ref 136–145)
SP GR UR REFRACTOMETRY: <1.005 (ref 1–1.03)
UROBILINOGEN UR QL STRIP.AUTO: 0.2 EU/DL (ref 0.2–1)
WBC # BLD AUTO: 6 K/UL (ref 3.6–11)
WBC URNS QL MICRO: NORMAL /HPF (ref 0–4)

## 2024-10-05 PROCEDURE — 36415 COLL VENOUS BLD VENIPUNCTURE: CPT

## 2024-10-05 PROCEDURE — 85025 COMPLETE CBC W/AUTO DIFF WBC: CPT

## 2024-10-05 PROCEDURE — 99284 EMERGENCY DEPT VISIT MOD MDM: CPT

## 2024-10-05 PROCEDURE — 81001 URINALYSIS AUTO W/SCOPE: CPT

## 2024-10-05 PROCEDURE — 93005 ELECTROCARDIOGRAM TRACING: CPT | Performed by: STUDENT IN AN ORGANIZED HEALTH CARE EDUCATION/TRAINING PROGRAM

## 2024-10-05 PROCEDURE — 80053 COMPREHEN METABOLIC PANEL: CPT

## 2024-10-05 ASSESSMENT — PAIN - FUNCTIONAL ASSESSMENT: PAIN_FUNCTIONAL_ASSESSMENT: 0-10

## 2024-10-05 ASSESSMENT — LIFESTYLE VARIABLES
HOW OFTEN DO YOU HAVE A DRINK CONTAINING ALCOHOL: NEVER
HOW MANY STANDARD DRINKS CONTAINING ALCOHOL DO YOU HAVE ON A TYPICAL DAY: PATIENT DOES NOT DRINK

## 2024-10-05 ASSESSMENT — PAIN SCALES - GENERAL: PAINLEVEL_OUTOF10: 3

## 2024-10-05 ASSESSMENT — PAIN DESCRIPTION - LOCATION: LOCATION: NECK

## 2024-10-06 VITALS
DIASTOLIC BLOOD PRESSURE: 60 MMHG | WEIGHT: 178 LBS | RESPIRATION RATE: 20 BRPM | OXYGEN SATURATION: 97 % | BODY MASS INDEX: 32.76 KG/M2 | HEIGHT: 62 IN | TEMPERATURE: 97.7 F | HEART RATE: 70 BPM | SYSTOLIC BLOOD PRESSURE: 117 MMHG

## 2024-10-06 NOTE — ED PROVIDER NOTES
[facility] [place]  EMERGENCY DEPARTMENT ENCOUNTER      Pt Name: Yane Presley  MRN: [id number]  Birthdate 1955  Date of evaluation: 10/5/2024  Provider: Federico Mckee MD    CHIEF COMPLAINT       Chief Complaint   Patient presents with    Dizziness         HISTORY OF PRESENT ILLNESS   (Location/Symptom, Timing/Onset, Context/Setting, Quality, Duration, Modifying Factors, Severity)  Note limiting factors.   Patient is a 69-year-old female presenting to the emergency department for chief complaint of intermittent dizziness and lightheadedness.  Patient denies any chest pain, shortness of breath nausea or vomiting.  Patient states that she has been under a great deal of stress recently due to her daughter who is her only child suffering with alcoholism currently in rehab.  She states that the stress has been building up over the last 2 years after the death of her aunt when she found out that her daughter was an alcoholic.  Patient denies any difficulties with speech, vision upper or lower extremity weakness numbness or tingling.            Review of External Medical Records:     Nursing Notes were reviewed.    REVIEW OF SYSTEMS    (2-9 systems for level 4, 10 or more for level 5)     Review of Systems    Except as noted above the remainder of the review of systems was reviewed and negative.       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 No pertinent family

## 2024-10-06 NOTE — ED TRIAGE NOTES
Pt.presents for evaluation of intermittant dizziness and lightheadedness x few days. Pt.states worse with movement or bending over.     Pt.states she has taken her blood pressure at home with wrist cuff and the bottom number was zero    Pt.states she is currently under a lot of stress.     PCP recently retired.     Denies weakness in extremities. Preferred to ambulate to room and not w/c

## 2024-10-07 LAB
EKG ATRIAL RATE: 75 BPM
EKG DIAGNOSIS: NORMAL
EKG P AXIS: 4 DEGREES
EKG P-R INTERVAL: 156 MS
EKG Q-T INTERVAL: 394 MS
EKG QRS DURATION: 82 MS
EKG QTC CALCULATION (BAZETT): 439 MS
EKG R AXIS: 6 DEGREES
EKG T AXIS: 16 DEGREES
EKG VENTRICULAR RATE: 75 BPM

## 2024-10-07 PROCEDURE — 93010 ELECTROCARDIOGRAM REPORT: CPT | Performed by: STUDENT IN AN ORGANIZED HEALTH CARE EDUCATION/TRAINING PROGRAM

## 2025-03-18 ENCOUNTER — OFFICE VISIT (OUTPATIENT)
Facility: CLINIC | Age: 70
End: 2025-03-18
Payer: MEDICARE

## 2025-03-18 VITALS
DIASTOLIC BLOOD PRESSURE: 80 MMHG | WEIGHT: 179 LBS | SYSTOLIC BLOOD PRESSURE: 118 MMHG | BODY MASS INDEX: 32.94 KG/M2 | OXYGEN SATURATION: 96 % | HEIGHT: 62 IN | HEART RATE: 73 BPM

## 2025-03-18 DIAGNOSIS — J45.40 MODERATE PERSISTENT ASTHMA WITHOUT COMPLICATION: ICD-10-CM

## 2025-03-18 DIAGNOSIS — E78.5 DYSLIPIDEMIA: ICD-10-CM

## 2025-03-18 DIAGNOSIS — E55.9 VITAMIN D DEFICIENCY: ICD-10-CM

## 2025-03-18 DIAGNOSIS — E11.9 TYPE 2 DIABETES MELLITUS WITHOUT COMPLICATION, WITHOUT LONG-TERM CURRENT USE OF INSULIN: ICD-10-CM

## 2025-03-18 DIAGNOSIS — I10 ESSENTIAL HYPERTENSION: ICD-10-CM

## 2025-03-18 DIAGNOSIS — E11.9 TYPE 2 DIABETES MELLITUS WITHOUT COMPLICATION, WITHOUT LONG-TERM CURRENT USE OF INSULIN: Primary | ICD-10-CM

## 2025-03-18 PROBLEM — R53.81 PHYSICAL DECONDITIONING: Status: RESOLVED | Noted: 2017-12-10 | Resolved: 2025-03-18

## 2025-03-18 PROBLEM — M35.05 SJOGREN SYNDROME WITH INFLAMMATORY ARTHRITIS: Status: ACTIVE | Noted: 2025-03-18

## 2025-03-18 PROBLEM — D07.1 VIN III (VULVAR INTRAEPITHELIAL NEOPLASIA III): Status: ACTIVE | Noted: 2025-03-18

## 2025-03-18 PROBLEM — K76.0 NON-ALCOHOLIC FATTY LIVER DISEASE: Status: ACTIVE | Noted: 2025-03-18

## 2025-03-18 PROCEDURE — 1123F ACP DISCUSS/DSCN MKR DOCD: CPT | Performed by: INTERNAL MEDICINE

## 2025-03-18 PROCEDURE — 3079F DIAST BP 80-89 MM HG: CPT | Performed by: INTERNAL MEDICINE

## 2025-03-18 PROCEDURE — 3074F SYST BP LT 130 MM HG: CPT | Performed by: INTERNAL MEDICINE

## 2025-03-18 PROCEDURE — 99204 OFFICE O/P NEW MOD 45 MIN: CPT | Performed by: INTERNAL MEDICINE

## 2025-03-18 PROCEDURE — 1126F AMNT PAIN NOTED NONE PRSNT: CPT | Performed by: INTERNAL MEDICINE

## 2025-03-18 PROCEDURE — 1160F RVW MEDS BY RX/DR IN RCRD: CPT | Performed by: INTERNAL MEDICINE

## 2025-03-18 PROCEDURE — 1159F MED LIST DOCD IN RCRD: CPT | Performed by: INTERNAL MEDICINE

## 2025-03-18 RX ORDER — GABAPENTIN 100 MG/1
100 CAPSULE ORAL 3 TIMES DAILY
COMMUNITY
Start: 2024-09-20

## 2025-03-18 RX ORDER — ALBUTEROL SULFATE 90 UG/1
1 INHALANT RESPIRATORY (INHALATION) EVERY 4 HOURS PRN
COMMUNITY
Start: 2024-06-22

## 2025-03-18 RX ORDER — TRIAMCINOLONE ACETONIDE 1 MG/G
OINTMENT TOPICAL 2 TIMES DAILY PRN
COMMUNITY
Start: 2024-05-17

## 2025-03-18 RX ORDER — ATORVASTATIN CALCIUM 20 MG/1
20 TABLET, FILM COATED ORAL DAILY
COMMUNITY
Start: 2024-09-03

## 2025-03-18 RX ORDER — FLUTICASONE PROPIONATE AND SALMETEROL 250; 50 UG/1; UG/1
1 POWDER RESPIRATORY (INHALATION) 2 TIMES DAILY
COMMUNITY
Start: 2024-12-26 | End: 2025-12-26

## 2025-03-18 RX ORDER — SPIRONOLACTONE AND HYDROCHLOROTHIAZIDE 25; 25 MG/1; MG/1
1 TABLET ORAL DAILY
COMMUNITY
Start: 2024-05-24

## 2025-03-18 RX ORDER — HYDROXYCHLOROQUINE SULFATE 200 MG/1
200 TABLET, FILM COATED ORAL 2 TIMES DAILY
COMMUNITY
Start: 2025-02-07 | End: 2025-05-08

## 2025-03-18 SDOH — ECONOMIC STABILITY: FOOD INSECURITY: WITHIN THE PAST 12 MONTHS, YOU WORRIED THAT YOUR FOOD WOULD RUN OUT BEFORE YOU GOT MONEY TO BUY MORE.: NEVER TRUE

## 2025-03-18 SDOH — ECONOMIC STABILITY: FOOD INSECURITY: WITHIN THE PAST 12 MONTHS, THE FOOD YOU BOUGHT JUST DIDN'T LAST AND YOU DIDN'T HAVE MONEY TO GET MORE.: NEVER TRUE

## 2025-03-18 ASSESSMENT — PATIENT HEALTH QUESTIONNAIRE - PHQ9
SUM OF ALL RESPONSES TO PHQ QUESTIONS 1-9: 0
1. LITTLE INTEREST OR PLEASURE IN DOING THINGS: NOT AT ALL
SUM OF ALL RESPONSES TO PHQ QUESTIONS 1-9: 0
2. FEELING DOWN, DEPRESSED OR HOPELESS: NOT AT ALL

## 2025-03-18 NOTE — PROGRESS NOTES
A/P:    1. Type 2 diabetes mellitus without complication, without long-term current use of insulin (HCC)  Previously well-controlled.  She may not be able to get Trulicity any longer.  She thinks tirzepatide is covered, so we will transition her over to tirzepatide, pending insurance approval.  Will obtain A1c with labs today.  Dilated eye exam is up-to-date.  - Tirzepatide 7.5 MG/0.5ML SOAJ; Inject 7.5 mg into the skin every 7 days  Dispense: 6 mL; Refill: 1  - Lipid Panel; Future  - Hemoglobin A1C; Future  - Comprehensive Metabolic Panel; Future    2. Moderate persistent asthma without complication  Stable, on Advair.  Continue Advair daily and albuterol HFA as needed.    3. Essential hypertension  Controlled, on current medications.  Continue medications, pending labs.  - Lipid Panel; Future  - Comprehensive Metabolic Panel; Future    4. Dyslipidemia  Tolerating atorvastatin.  Continue medication, pending labs.  - Lipid Panel; Future  - Comprehensive Metabolic Panel; Future    5. Vitamin D deficiency  On oral vitamin D replacement.  Will assess levels with labs today.  - Vitamin D 25 Hydroxy; Future       Follow up:  6 months AWV        An electronic signature was used to authenticate this note.    --Nilda Syed MD         HPI: Yane Presley is here to establish a new PCP.       Diabetes: Ms. Presley has had diabetes since the 1990s. She has been taking Trulicity consistently--was started on Trulicity to help with her weight. Her dose was causing constipation at the 1.5 mg dose, and it was decreased to 0.75 mg. Her sugar cravings were increased, so she is back to 1.5 mg. She has been able to manage her BMs with natural things, green tea with probiotics. She does have neuropathy in her left leg, only uses gabapentin as needed (from her lumbar spine). A1C was 5.9 in September. Last dilated eye exam was in January 2025.    Cholesterol: She is taking medication for cholesterol as directed, without obvious side

## 2025-03-19 LAB
25(OH)D3+25(OH)D2 SERPL-MCNC: 16.8 NG/ML (ref 30–100)
ALBUMIN SERPL-MCNC: 4.2 G/DL (ref 3.9–4.9)
ALP SERPL-CCNC: 95 IU/L (ref 44–121)
ALT SERPL-CCNC: 41 IU/L (ref 0–32)
AST SERPL-CCNC: 46 IU/L (ref 0–40)
BILIRUB SERPL-MCNC: 1 MG/DL (ref 0–1.2)
BUN SERPL-MCNC: 11 MG/DL (ref 8–27)
BUN/CREAT SERPL: 17 (ref 12–28)
CALCIUM SERPL-MCNC: 9.2 MG/DL (ref 8.7–10.3)
CHLORIDE SERPL-SCNC: 99 MMOL/L (ref 96–106)
CHOLEST SERPL-MCNC: 87 MG/DL (ref 100–199)
CO2 SERPL-SCNC: 26 MMOL/L (ref 20–29)
CREAT SERPL-MCNC: 0.66 MG/DL (ref 0.57–1)
EGFRCR SERPLBLD CKD-EPI 2021: 95 ML/MIN/1.73
GLOBULIN SER CALC-MCNC: 2.4 G/DL (ref 1.5–4.5)
GLUCOSE SERPL-MCNC: 108 MG/DL (ref 70–99)
HBA1C MFR BLD: 6.5 % (ref 4.8–5.6)
HDLC SERPL-MCNC: 41 MG/DL
IMP & REVIEW OF LAB RESULTS: NORMAL
LDLC SERPL CALC-MCNC: 34 MG/DL (ref 0–99)
Lab: NORMAL
POTASSIUM SERPL-SCNC: 4.1 MMOL/L (ref 3.5–5.2)
PROT SERPL-MCNC: 6.6 G/DL (ref 6–8.5)
SODIUM SERPL-SCNC: 141 MMOL/L (ref 134–144)
TRIGL SERPL-MCNC: 47 MG/DL (ref 0–149)
VLDLC SERPL CALC-MCNC: 12 MG/DL (ref 5–40)

## 2025-03-20 ENCOUNTER — RESULTS FOLLOW-UP (OUTPATIENT)
Facility: CLINIC | Age: 70
End: 2025-03-20

## 2025-03-25 ENCOUNTER — TELEPHONE (OUTPATIENT)
Facility: CLINIC | Age: 70
End: 2025-03-25

## 2025-03-25 NOTE — TELEPHONE ENCOUNTER
Patient is requesting for her medication to be sent to the pharmacy below so that they can price the medication   Mounjaro 7.5   [facility] [id number] - [place] - [address] - P [phone] - F [phone]

## 2025-06-11 ENCOUNTER — TELEPHONE (OUTPATIENT)
Facility: CLINIC | Age: 70
End: 2025-06-11

## 2025-06-11 NOTE — TELEPHONE ENCOUNTER
Patient calling to speak with nurse regarding Mounjaro - states provider prescribed this medication and she has had some rashes/bumps on her arms for a while, they do not hurt, sometimes itches and uses cortisone cream - states this started around April and wants to know if this is because of the medication   Patient did reach out to a dermatologist but cannot be seen for a while

## 2025-08-26 ENCOUNTER — TELEPHONE (OUTPATIENT)
Facility: CLINIC | Age: 70
End: 2025-08-26

## 2025-08-26 ENCOUNTER — PATIENT MESSAGE (OUTPATIENT)
Facility: CLINIC | Age: 70
End: 2025-08-26

## 2025-09-01 DIAGNOSIS — E11.9 TYPE 2 DIABETES MELLITUS WITHOUT COMPLICATION, WITHOUT LONG-TERM CURRENT USE OF INSULIN (HCC): ICD-10-CM

## 2025-09-01 RX ORDER — TIRZEPATIDE 7.5 MG/.5ML
INJECTION, SOLUTION SUBCUTANEOUS
Qty: 6 ML | Refills: 1 | Status: ACTIVE | OUTPATIENT
Start: 2025-09-01